# Patient Record
Sex: FEMALE | Race: WHITE | NOT HISPANIC OR LATINO | Employment: UNEMPLOYED | URBAN - METROPOLITAN AREA
[De-identification: names, ages, dates, MRNs, and addresses within clinical notes are randomized per-mention and may not be internally consistent; named-entity substitution may affect disease eponyms.]

---

## 2017-01-08 ENCOUNTER — HOSPITAL ENCOUNTER (EMERGENCY)
Facility: HOSPITAL | Age: 27
Discharge: HOME | End: 2017-01-08
Attending: EMERGENCY MEDICINE
Payer: MEDICAID

## 2017-01-08 VITALS
TEMPERATURE: 98 F | WEIGHT: 215 LBS | SYSTOLIC BLOOD PRESSURE: 124 MMHG | OXYGEN SATURATION: 99 % | RESPIRATION RATE: 20 BRPM | HEIGHT: 65 IN | BODY MASS INDEX: 35.82 KG/M2 | HEART RATE: 98 BPM | DIASTOLIC BLOOD PRESSURE: 60 MMHG

## 2017-01-08 DIAGNOSIS — L02.31 ABSCESS OF BUTTOCK, RIGHT: ICD-10-CM

## 2017-01-08 DIAGNOSIS — N30.90 CYSTITIS: Primary | ICD-10-CM

## 2017-01-08 LAB
B-HCG UR QL: NEGATIVE
BACTERIA #/AREA URNS HPF: ABNORMAL /HPF
BILIRUB UR QL STRIP: NEGATIVE
CLARITY UR: ABNORMAL
COLOR UR: ABNORMAL
CTP QC/QA: YES
GLUCOSE UR QL STRIP: NEGATIVE
HGB UR QL STRIP: ABNORMAL
HYALINE CASTS #/AREA URNS LPF: 0 /LPF
KETONES UR QL STRIP: NEGATIVE
LEUKOCYTE ESTERASE UR QL STRIP: ABNORMAL
MICROSCOPIC COMMENT: ABNORMAL
NITRITE UR QL STRIP: POSITIVE
PH UR STRIP: 5 [PH] (ref 5–8)
PROT UR QL STRIP: ABNORMAL
RBC #/AREA URNS HPF: 6 /HPF (ref 0–4)
SP GR UR STRIP: ABNORMAL (ref 1–1.03)
URN SPEC COLLECT METH UR: ABNORMAL
UROBILINOGEN UR STRIP-ACNC: 1 EU/DL
WBC #/AREA URNS HPF: >100 /HPF (ref 0–5)

## 2017-01-08 PROCEDURE — 25000003 PHARM REV CODE 250: Performed by: EMERGENCY MEDICINE

## 2017-01-08 PROCEDURE — 81000 URINALYSIS NONAUTO W/SCOPE: CPT

## 2017-01-08 PROCEDURE — 87088 URINE BACTERIA CULTURE: CPT

## 2017-01-08 PROCEDURE — 99284 EMERGENCY DEPT VISIT MOD MDM: CPT

## 2017-01-08 PROCEDURE — 87086 URINE CULTURE/COLONY COUNT: CPT

## 2017-01-08 PROCEDURE — 87186 SC STD MICRODIL/AGAR DIL: CPT

## 2017-01-08 PROCEDURE — 87077 CULTURE AEROBIC IDENTIFY: CPT

## 2017-01-08 PROCEDURE — 81025 URINE PREGNANCY TEST: CPT | Performed by: EMERGENCY MEDICINE

## 2017-01-08 RX ORDER — IBUPROFEN 600 MG/1
600 TABLET ORAL
Status: COMPLETED | OUTPATIENT
Start: 2017-01-08 | End: 2017-01-08

## 2017-01-08 RX ORDER — CLINDAMYCIN HYDROCHLORIDE 150 MG/1
300 CAPSULE ORAL 4 TIMES DAILY
Qty: 40 CAPSULE | Refills: 0 | Status: SHIPPED | OUTPATIENT
Start: 2017-01-08 | End: 2017-01-13

## 2017-01-08 RX ORDER — HYDROCODONE BITARTRATE AND ACETAMINOPHEN 5; 325 MG/1; MG/1
1 TABLET ORAL EVERY 6 HOURS PRN
Qty: 20 TABLET | Refills: 0 | Status: SHIPPED | OUTPATIENT
Start: 2017-01-08 | End: 2017-04-24

## 2017-01-08 RX ORDER — PHENAZOPYRIDINE HYDROCHLORIDE 200 MG/1
200 TABLET, FILM COATED ORAL 3 TIMES DAILY
Qty: 6 TABLET | Refills: 0 | Status: SHIPPED | OUTPATIENT
Start: 2017-01-08 | End: 2017-01-18

## 2017-01-08 RX ORDER — NITROFURANTOIN 25; 75 MG/1; MG/1
100 CAPSULE ORAL 2 TIMES DAILY
Qty: 10 CAPSULE | Refills: 0 | Status: SHIPPED | OUTPATIENT
Start: 2017-01-08 | End: 2017-01-13

## 2017-01-08 RX ADMIN — IBUPROFEN 600 MG: 600 TABLET, FILM COATED ORAL at 04:01

## 2017-01-08 NOTE — DISCHARGE INSTRUCTIONS
Abscess (Antibiotic Treatment Only)  An abscess (sometimes called a boil) occurs when bacteria get trapped under the skin and begin to grow. Pus forms inside the abscess as the body responds to the bacteria. An abscess can occur with an insect bite, ingrown hair, blocked oil gland, pimple, cyst, or puncture wound.  In the early stages, redness and tenderness are the only symptoms. Sometimes, this stage can be treated with antibiotics alone. If the abscess does not respond to antibiotic treatment, it will need to be drained with a small cut, under local anesthesia.  Home care  The following will help you care for your abscess at home:  · Soak the wound in hot water or apply hot packs (small towel soaked in hot water) to the area for 20 minutes at a time. Do this 3 to 4 times a day.  · Do not maryse, squeeze, or pop the boil yourself.  · Apply antibiotic cream or ointment onto the skin 3 to 4 times a day, unless something else was prescribed. Some ointments include an antibiotic plus a local pain reliever.  · If your doctor prescribed antibiotics, do not stop taking this medication until you have finished the medication or the doctor tells you to stop.  · You may use an over-the-counter pain medication to control pain, unless another pain medicine was prescribed. If you have chronic liver or kidney disease or ever had a stomach ulcer or gastrointestinal bleeding, talk with your doctor before using these any of these.  Follow-up care  Follow up with your health care provider as advised by our staff. Look at your wound each day for the signs of worsening infection listed below.  When to seek medical care  Get prompt medical attention if any of the following occur:  · An increase in redness or swelling  · Red streaks in the skin leading away from the abscess  · An increase in local pain or swelling  · Fever of 100.4ºF (38ºC) or higher, or as directed by your health care provider  · Pus or fluid coming from the  abscess  · Boil returns after getting better  © 8974-9923 [employer]. [address] [place] [address]. All rights reserved. This information is not intended as a substitute for professional medical care. Always follow your healthcare professional's instructions.          Understanding Urinary Tract Infections (UTIs)  Most UTIs are caused by bacteria, although they may also be caused by viruses or fungi. Bacteria from the bowel are the most common source of infection. The infection may begin because of any of the following:  · Sexual activity. During sex, germs can travel from the penis, vagina, or rectum into the urethra.   · Germs on the skin outside the rectum may travel into the urethra. This is more common in women since the rectum and urethra are closer to each other than in men. Wiping from front to back after using the toilet and keeping the area clean can help prevent germs from getting to the urethra.  · Blockage of urine flow through the urinary tract. If urine sits too long, germs may begin to grow out of control.      Parts of the urinary tract  The infection can occur in any part of the urinary tract.  · The kidneys collect and store urine.  · The ureters carry urine from the kidneys to the bladder.  · The bladder holds urine until you are ready to let it out.  · The urethra carries urine from the bladder out of the body. It is shorter in women, so bacteria can move through it more easily. The urethra is longer in men, so a UTI is less likely to reach the bladder or kidneys in men.  © 2000-2016 [employer]. [address] [place] [id number]. All rights reserved. This information is not intended as a substitute for professional medical care. Always follow your healthcare professional's instructions.

## 2017-01-08 NOTE — ED AVS SNAPSHOT
[facility]  [address] [facility] [address]               Therese De Leon   2017  2:41 PM   ED    Description:  Female : 1990   Department:  [facility]           Your Care was Coordinated By:     Provider Role From To    Pablito Burciaga Jr., MD Attending Provider 17 1458 --      Reason for Visit     Urinary Tract Infection           Diagnoses this Visit        Comments    Cystitis    -  Primary     Abscess of buttock, right           ED Disposition     None           To Do List           Follow-up Information     Follow up with [facility] In 2 days.    Specialty:  Emergency Medicine    Why:  For wound re-check    Contact information:    [address]  [place] [address] [phone]       These Medications        Disp Refills Start End    clindamycin (CLEOCIN) 150 MG capsule 40 capsule 0 2017    Take 2 capsules (300 mg total) by mouth 4 (four) times daily. - Oral    Pharmacy: [facility]/pharmacy #[id number] - [address] [facility] Ph #: [phone]       nitrofurantoin, macrocrystal-monohydrate, (MACROBID) 100 MG capsule 10 capsule 0 2017    Take 1 capsule (100 mg total) by mouth 2 (two) times daily. - Oral    Pharmacy: [facility]/pharmacy #[id number] [address] [facility] Ph #: [phone]       phenazopyridine (PYRIDIUM) 200 MG tablet 6 tablet 0 2017    Take 1 tablet (200 mg total) by mouth 3 (three) times daily. - Oral    Pharmacy: [facility]/pharmacy #[id number] [facility] Ph #: [phone]       hydrocodone-acetaminophen 5-325mg (NORCO) 5-325 mg per tablet 20 tablet 0 2017     Take 1 tablet by mouth every 6 (six) hours as needed for Pain. - Oral    Pharmacy: [facility]/pharmacy #[id number] [facility]  [place]  #: [phone]         [facility]     [facility] - 24/7 Assistance  Registered nurses in the [facility] provide clinical advisement, health education, appointment booking, and other advisory services.  Call for this free service at [phone].             Medications           Message regarding Medications     Verify the changes and/or additions to your medication regime listed below are the same as discussed with your clinician today.  If any of these changes or additions are incorrect, please notify your healthcare provider.        START taking these NEW medications        Refills    clindamycin (CLEOCIN) 150 MG capsule 0    Sig: Take 2 capsules (300 mg total) by mouth 4 (four) times daily.    Class: Print    Route: Oral    nitrofurantoin, macrocrystal-monohydrate, (MACROBID) 100 MG capsule 0    Sig: Take 1 capsule (100 mg total) by mouth 2 (two) times daily.    Class: Print    Route: Oral    phenazopyridine (PYRIDIUM) 200 MG tablet 0    Sig: Take 1 tablet (200 mg total) by mouth 3 (three) times daily.    Class: Print    Route: Oral    hydrocodone-acetaminophen 5-325mg (NORCO) 5-325 mg per tablet 0    Sig: Take 1 tablet by mouth every 6 (six) hours as needed for Pain.    Class: Print    Route: Oral      These medications were administered today        Dose Freq    ibuprofen tablet 600 mg 600 mg ED 1 Time    Sig: Take 1 tablet (600 mg total) by mouth ED 1 Time.    Class: Normal    Route: Oral           Verify that the below list of medications is an accurate representation of the medications you are currently taking.  If none reported, the list may be blank. If incorrect, please contact your healthcare provider. Carry this list with you in case of emergency.           Current Medications     clindamycin (CLEOCIN) 150 MG capsule Take 2 capsules (300 mg total) by mouth 4 (four) times daily.    hydrocodone-acetaminophen 5-325mg (NORCO) 5-325 mg per tablet Take 1  "tablet by mouth every 6 (six) hours as needed for Pain.    ibuprofen tablet 600 mg Take 1 tablet (600 mg total) by mouth ED 1 Time.    MIRENA 20 mcg/24 hr (5 years) IUD     nitrofurantoin, macrocrystal-monohydrate, (MACROBID) 100 MG capsule Take 1 capsule (100 mg total) by mouth 2 (two) times daily.    phenazopyridine (PYRIDIUM) 200 MG tablet Take 1 tablet (200 mg total) by mouth 3 (three) times daily.           Clinical Reference Information           Your Vitals Were     BP Pulse Temp Resp Height Weight    131/69 (BP Location: Left arm, Patient Position: Sitting) 108 98 °F (36.7 °C) (Oral) 18 5' 5" (1.651 m) 97.5 kg (215 lb)    SpO2 BMI             99% 35.78 kg/m2         Allergies as of 1/8/2017        Reactions    Bactrim [Sulfamethoxazole-trimethoprim]     Pt was told when she was younger    Pcn [Penicillins]     Pt was told when she was younger      Immunizations Administered on Date of Encounter - 1/8/2017     None      ED Micro, Lab, POCT     Start Ordered       Status Ordering Provider    01/08/17 1618 01/08/17 1618  Urine culture  Once      In process     01/08/17 1617 01/08/17 1617  Urine culture **CANNOT BE ORDERED STAT**  Add-on      Completed     01/08/17 1449 01/08/17 1448  Urinalysis  STAT      In process     01/08/17 1449 01/08/17 1448  POCT urine pregnancy  Once      Final result       ED Imaging Orders     None        Discharge Instructions         Abscess (Antibiotic Treatment Only)  An abscess (sometimes called a boil) occurs when bacteria get trapped under the skin and begin to grow. Pus forms inside the abscess as the body responds to the bacteria. An abscess can occur with an insect bite, ingrown hair, blocked oil gland, pimple, cyst, or puncture wound.  In the early stages, redness and tenderness are the only symptoms. Sometimes, this stage can be treated with antibiotics alone. If the abscess does not respond to antibiotic treatment, it will need to be drained with a small cut, under " local anesthesia.  Home care  The following will help you care for your abscess at home:  · Soak the wound in hot water or apply hot packs (small towel soaked in hot water) to the area for 20 minutes at a time. Do this 3 to 4 times a day.  · Do not maryse, squeeze, or pop the boil yourself.  · Apply antibiotic cream or ointment onto the skin 3 to 4 times a day, unless something else was prescribed. Some ointments include an antibiotic plus a local pain reliever.  · If your doctor prescribed antibiotics, do not stop taking this medication until you have finished the medication or the doctor tells you to stop.  · You may use an over-the-counter pain medication to control pain, unless another pain medicine was prescribed. If you have chronic liver or kidney disease or ever had a stomach ulcer or gastrointestinal bleeding, talk with your doctor before using these any of these.  Follow-up care  Follow up with your health care provider as advised by our staff. Look at your wound each day for the signs of worsening infection listed below.  When to seek medical care  Get prompt medical attention if any of the following occur:  · An increase in redness or swelling  · Red streaks in the skin leading away from the abscess  · An increase in local pain or swelling  · Fever of 100.4ºF (38ºC) or higher, or as directed by your health care provider  · Pus or fluid coming from the abscess  · Boil returns after getting better  © 9474-0800 [employer]. [address] [place] [address]. All rights reserved. This information is not intended as a substitute for professional medical care. Always follow your healthcare professional's instructions.          Understanding Urinary Tract Infections (UTIs)  Most UTIs are caused by bacteria, although they may also be caused by viruses or fungi. Bacteria from the bowel are the most common source of infection. The infection may begin because of any of the following:  · Sexual  activity. During sex, germs can travel from the penis, vagina, or rectum into the urethra.   · Germs on the skin outside the rectum may travel into the urethra. This is more common in women since the rectum and urethra are closer to each other than in men. Wiping from front to back after using the toilet and keeping the area clean can help prevent germs from getting to the urethra.  · Blockage of urine flow through the urinary tract. If urine sits too long, germs may begin to grow out of control.      Parts of the urinary tract  The infection can occur in any part of the urinary tract.  · The kidneys collect and store urine.  · The ureters carry urine from the kidneys to the bladder.  · The bladder holds urine until you are ready to let it out.  · The urethra carries urine from the bladder out of the body. It is shorter in women, so bacteria can move through it more easily. The urethra is longer in men, so a UTI is less likely to reach the bladder or kidneys in men.  © 0437-9183 [employer]. [address] [place] [address]. All rights reserved. This information is not intended as a substitute for professional medical care. Always follow your healthcare professional's instructions.          Smoking Cessation     If you would like to quit smoking:   You may be eligible for free services if you are a [place] resident and started smoking cigarettes before September 1, 1988.  Call the Smoking Cessation [employer] ([employer]) toll free at (060) 146-3392 or (187) 042-1592.   Call 6-527-QUIT-NOW if you do not meet the above criteria.             [facility] complies with applicable Federal civil rights laws and does not discriminate on the basis of race, color, national origin, age, disability, or sex.        Language Assistance Services     ATTENTION: Language assistance services are available, free of charge. Please call [phone].      ATENCIÓN: devorah Pisano  disposición servicios gratuitos de asistencia lingüística. Lllea al [phone].     RUCHI Ý: N?u b?n nói Ti?ng Vi?t, có các d?ch v? h? tr? ngôn ng? mi?n phí dành cho b?n. G?i s? [phone].

## 2017-01-08 NOTE — ED PROVIDER NOTES
Encounter Date: 2017       History     Chief Complaint   Patient presents with    Urinary Tract Infection     for 4 days, also abcess to sacral region.     Review of patient's allergies indicates:   Allergen Reactions    Bactrim [sulfamethoxazole-trimethoprim]      Pt was told when she was younger    Pcn [penicillins]      Pt was told when she was younger     HPI Comments: Therese De Leon, a 26 y.o. female, complains of dysuria and frequency for the past 4 days.  She also complains of soreness on the superior aspect of her buttock.  She denies any drainage.  She denies any fever.  Pain location: Suprapubic pressure and buttock pain  Pain Severity: Mild-to-moderate    Pain timin days  Pain character: Pressure in the suprapubic area in mild distress.  Throbbing buttock    Associated with or Modified by: (see above)        Past Medical History   Diagnosis Date    Acid reflux     Gallstones 11-12-15    Pneumonia      2 episodes of pneumonia    Seasonal allergies      Past Medical History Pertinent Negatives   Diagnosis Date Noted    Asthma 2016    Diabetes mellitus 2016    Hypertension 2016     History reviewed. No pertinent past surgical history.  Family History   Problem Relation Age of Onset    Asthma Mother     Diabetes Mother     Scoliosis Mother      Social History   Substance Use Topics    Smoking status: Current Every Day Smoker     Packs/day: 0.20     Types: Cigarettes    Smokeless tobacco: Never Used    Alcohol use No     Review of Systems   Constitutional: Negative for fever.   Genitourinary: Positive for difficulty urinating, dysuria and frequency.   Skin:        Pain and swelling superior buttock possible boil   All other systems reviewed and are negative.      Physical Exam   Initial Vitals   BP Pulse Resp Temp SpO2   17 1320 17 1320 17 1320 17 1320 17 1320   131/69 108 18 98 °F (36.7 °C) 99 %     Physical Exam    Nursing note and  vitals reviewed.  Constitutional: She appears well-developed and well-nourished.   Abdominal:   Mild suprapubic tenderness but no guarding no rebound.  No flank tenderness to percussion.   Skin: Skin is warm and dry.   There is a 3 cm diameter area of tenderness and induration in the superior gluteal fold.  There is no fluctuance.  There is no drainage.  There is no cellulitis.   Psychiatric: She has a normal mood and affect. Her behavior is normal. Thought content normal.         ED Course   Procedures  Labs Reviewed   URINALYSIS   POCT URINE PREGNANCY             Medical Decision Making:   Initial Assessment:    26 y.o. female, complains of dysuria and frequency for the past 4 days.  She also complains of soreness on the superior aspect of her buttock.  She denies any drainage.  She denies any fever.  Pain location: Suprapubic pressure and buttock pain  Pain Severity: Mild-to-moderate    Pain timin days  PE: Mild suprapubic tenderness;  Soft tissue swelling with early abscess formation, no fluctuance no drainage no cellulitis.  Clinical Tests:   Lab Tests: Ordered  ED Management:  The patient will be treated for urinary tract infection with Macrobid and Pyridium.  Culture was taken of urine.  The patient will be treated for an abscess of the buttocks with clindamycin and Norco.  She will return in 48 hours for wound check.                   ED Course     Clinical Impression:   The primary encounter diagnosis was Cystitis. A diagnosis of Abscess of buttock, right was also pertinent to this visit.          Pablito Burciaga Jr., MD  17 [phone]

## 2017-01-10 ENCOUNTER — HOSPITAL ENCOUNTER (EMERGENCY)
Facility: HOSPITAL | Age: 27
Discharge: HOME | End: 2017-01-10
Attending: EMERGENCY MEDICINE
Payer: MEDICAID

## 2017-01-10 VITALS
HEIGHT: 65 IN | HEART RATE: 99 BPM | DIASTOLIC BLOOD PRESSURE: 77 MMHG | OXYGEN SATURATION: 99 % | TEMPERATURE: 98 F | SYSTOLIC BLOOD PRESSURE: 125 MMHG | WEIGHT: 215 LBS | BODY MASS INDEX: 35.82 KG/M2 | RESPIRATION RATE: 18 BRPM

## 2017-01-10 DIAGNOSIS — L02.91 ABSCESS: Primary | ICD-10-CM

## 2017-01-10 LAB
B-HCG UR QL: NEGATIVE
BACTERIA UR CULT: NORMAL
CTP QC/QA: YES

## 2017-01-10 PROCEDURE — 10061 I&D ABSCESS COMP/MULTIPLE: CPT

## 2017-01-10 PROCEDURE — 25000003 PHARM REV CODE 250: Performed by: NURSE PRACTITIONER

## 2017-01-10 PROCEDURE — 99283 EMERGENCY DEPT VISIT LOW MDM: CPT | Mod: 25

## 2017-01-10 RX ORDER — LIDOCAINE HYDROCHLORIDE 10 MG/ML
10 INJECTION INFILTRATION; PERINEURAL
Status: COMPLETED | OUTPATIENT
Start: 2017-01-10 | End: 2017-01-10

## 2017-01-10 RX ORDER — OXYCODONE AND ACETAMINOPHEN 5; 325 MG/1; MG/1
1 TABLET ORAL EVERY 4 HOURS PRN
Qty: 12 TABLET | Refills: 0 | Status: SHIPPED | OUTPATIENT
Start: 2017-01-10 | End: 2017-04-24

## 2017-01-10 RX ORDER — OXYCODONE AND ACETAMINOPHEN 5; 325 MG/1; MG/1
1 TABLET ORAL
Status: COMPLETED | OUTPATIENT
Start: 2017-01-10 | End: 2017-01-10

## 2017-01-10 RX ADMIN — LIDOCAINE HYDROCHLORIDE 10 ML: 10 INJECTION, SOLUTION INFILTRATION; PERINEURAL at 07:01

## 2017-01-10 RX ADMIN — OXYCODONE AND ACETAMINOPHEN 1 TABLET: 5; 325 TABLET ORAL at 08:01

## 2017-01-10 NOTE — ED AVS SNAPSHOT
[facility]  [address] [place] [address]               Therese De Leon   1/10/2017  7:22 PM   ED    Description:  Female : 1990   Department:  [facility]           Your Care was Coordinated By:     Provider Role From To    Armando Marcus MD Attending Provider 01/10/17 1945 --    Sarbjit Salas NP Nurse Practitioner 01/10/17 1945 --      Reason for Visit     Abscess           Diagnoses this Visit        Comments    Abscess    -  Primary       ED Disposition     None           To Do List           Follow-up Information     Follow up with [facility] In 2 days.    Specialty:  Emergency Medicine    Why:  For wound re-check    Contact information:    [address] [place] [address] [phone]        Follow up with Sabi Rhodes MD. Schedule an appointment as soon as possible for a visit in 2 days.    Specialty:  General Surgery    Why:  For wound re-check    Contact information:    [address] [facility] [address]  [address]  [place] [address]  [phone]        [facility]     [facility] -  Assistance  Registered nurses in the [facility] provide clinical advisement, health education, appointment booking, and other advisory services.  Call for this free service at [phone].             Medications           Message regarding Medications     Verify the changes and/or additions to your medication regime listed below are the same as discussed with your clinician today.  If any of these changes or additions are incorrect, please notify your healthcare provider.        These medications were administered today        Dose Freq    lidocaine HCL 10 mg/ml (1%) injection 10 mL 10 mL ED 1 Time    Sig: 10 mLs by Infiltration route ED 1 Time.    Class: Normal    Route: Infiltration    oxycodone-acetaminophen 5-325 mg per tablet 1 tablet 1 tablet ED 1 Time    Sig: Take 1  "tablet by mouth ED 1 Time.    Class: Normal    Route: Oral           Verify that the below list of medications is an accurate representation of the medications you are currently taking.  If none reported, the list may be blank. If incorrect, please contact your healthcare provider. Carry this list with you in case of emergency.           Current Medications     clindamycin (CLEOCIN) 150 MG capsule Take 2 capsules (300 mg total) by mouth 4 (four) times daily.    hydrocodone-acetaminophen 5-325mg (NORCO) 5-325 mg per tablet Take 1 tablet by mouth every 6 (six) hours as needed for Pain.    nitrofurantoin, macrocrystal-monohydrate, (MACROBID) 100 MG capsule Take 1 capsule (100 mg total) by mouth 2 (two) times daily.    phenazopyridine (PYRIDIUM) 200 MG tablet Take 1 tablet (200 mg total) by mouth 3 (three) times daily.    MIRENA 20 mcg/24 hr (5 years) IUD     oxycodone-acetaminophen 5-325 mg per tablet 1 tablet Take 1 tablet by mouth ED 1 Time.           Clinical Reference Information           Your Vitals Were     BP Pulse Temp Resp Height Weight    125/77 (BP Location: Left arm, Patient Position: Standing) 99 98.3 °F (36.8 °C) (Oral) 18 5' 5" (1.651 m) 97.5 kg (215 lb)    SpO2 BMI             99% 35.78 kg/m2         Allergies as of 1/10/2017        Reactions    Bactrim [Sulfamethoxazole-trimethoprim]     Pt was told when she was younger    Pcn [Penicillins]     Pt was told when she was younger      Immunizations Administered on Date of Encounter - 1/10/2017     None      ED Micro, Lab, POCT     Start Ordered       Status Ordering Provider    01/10/17 0000 01/10/17 1933  POCT urine pregnancy     Comments:  This order was created through External Result Entry    Completed       ED Imaging Orders     None        Discharge Instructions         Abscess (Incision & Drainage)  An abscess (sometimes called a boil) occurs when bacteria get trapped under the skin and begin to grow. Pus forms inside the abscess as the body " responds to the bacteria. An abscess can occur with an insect bite, ingrown hair, blocked oil gland, pimple, cyst, or puncture wound.  Treatment of your abscess has required an incision to drain the pus. If the abscess pocket was large, a gauze packing may have been inserted. This will need to be removed and possibly replaced on your next visit. Antibiotics are not needed in the treatment of a simple abscess, unless the infection is spreading into the skin around the wound (cellulitis).  Healing of the wound will take about 1 to 2 weeks, depending on the size of the abscess. Healthy tissue will grow from the bottom and sides of the opening until it seals over.  Home care  The following home care guidelines can help your wound heal:  · The wound may drain for the first 2 days. Cover the wound with a clean dry dressing. If the dressing becomes soaked with blood or pus, change it.  · If a gauze packing was placed inside the abscess cavity, you may be told to remove it yourself. You may do this in the shower. Once the packing is removed, you should wash the area in the shower or bath 3 to 4 times a day, until the skin opening has closed. Make sure you wash your hands after changing the packing or cleaning the wound.  · If you were prescribed antibiotics, take them as directed until they are all gone.  · You may use acetaminophen or ibuprofen to control pain, unless another pain medicine was prescribed. If you have liver disease or ever had a stomach ulcer, talk with your doctor before using these medicines.  Follow-up care  Follow up with your doctor as advised by our staff. If a gauze packing was inserted in your wound, it should be removed in 1 to 2 days. Check your wound every day for the signs of worsening infection listed below.  When to seek medical advice  Call your healthcare provider right away if any of the following occur:  · Increasing redness or swelling  · Red streaks in the skin leading away from the  wound  · Increasing local pain or swelling  · Continued pus draining from the wound 2 days after treatment  · Fever of 100.4ºF (38ºC) or higher, or as directed by your healthcare provider  · Boil returns when you are at home  © 8529-9153 [employer]. [address] [place] [address]. All rights reserved. This information is not intended as a substitute for professional medical care. Always follow your healthcare professional's instructions.          Smoking Cessation     If you would like to quit smoking:   You may be eligible for free services if you are a [place] resident and started smoking cigarettes before September 1, 1988.  Call the Smoking Cessation [employer] (SCT) toll free at (264) 361-9760 or (813) 521-0917.   Call 8-819-QUIT-NOW if you do not meet the above criteria.             [facility] complies with applicable Federal civil rights laws and does not discriminate on the basis of race, color, national origin, age, disability, or sex.        Language Assistance Services     ATTENTION: Language assistance services are available, free of charge. Please call [phone].      ATENCIÓN: Si habla español, tiene a ibarra disposición servicios gratuitos de asistencia lingüística. Llame al [phone].     RUCHI Ý: N?u b?n nói Ti?ng Vi?t, có các d?ch v? h? tr? ngôn ng? mi?n phí dành cho b?n. G?i s? [phone].

## 2017-01-11 NOTE — ED PROVIDER NOTES
Encounter Date: 1/10/2017       History     Chief Complaint   Patient presents with    Abscess     left gluteal fold abscess x 6 days, was seen in ED and told to return to ED if worsened.  Pt states pain is worse     Review of patient's allergies indicates:   Allergen Reactions    Bactrim [sulfamethoxazole-trimethoprim]      Pt was told when she was younger    Pcn [penicillins]      Pt was told when she was younger     HPI Comments: Well appearing, non toxic, afebrile 26 year old female here with c/o abscess. Pt reports present x 6 days. Seen here 2 days ago and prescribed Clindamycin and Norco, and Macrobid with pyridium for UTI. Pt reports improvement in urinary symptoms but pain and swelling worsening to abscess. Pt reports taking medications as prescribed.    Patient is a 26 y.o. female presenting with the following complaint: abscess. The history is provided by the patient.   Abscess    This is a new problem. Illness onset: 6 days ago. The problem occurs continuously. The problem has been gradually worsening. Affected Location: left gluteal cleft. The pain is at a severity of 8/10. The abscess is characterized by painfulness, swelling and redness. Pertinent negatives include no anorexia, no decrease in physical activity, not sleeping less, not drinking less, no fever, no diarrhea, no vomiting, no congestion, no rhinorrhea, no sore throat, no decreased responsiveness and no cough.     Past Medical History   Diagnosis Date    Acid reflux     Gallstones 11-12-15    Pneumonia 2014     2 episodes of pneumonia    Seasonal allergies      Past Medical History Pertinent Negatives   Diagnosis Date Noted    Asthma 4/13/2016    Diabetes mellitus 4/13/2016    Hypertension 4/13/2016     History reviewed. No pertinent past surgical history.  Family History   Problem Relation Age of Onset    Asthma Mother     Diabetes Mother     Scoliosis Mother      Social History   Substance Use Topics    Smoking status:  Current Every Day Smoker     Packs/day: 0.20     Types: Cigarettes    Smokeless tobacco: Never Used    Alcohol use No     Review of Systems   Constitutional: Negative for chills, decreased responsiveness and fever.   HENT: Negative for congestion, rhinorrhea and sore throat.    Respiratory: Negative for cough and shortness of breath.    Cardiovascular: Negative for chest pain.   Gastrointestinal: Negative for abdominal distention, abdominal pain, anorexia, diarrhea, nausea and vomiting.   Genitourinary: Negative for difficulty urinating and dysuria.   Musculoskeletal: Negative for arthralgias, back pain, gait problem, joint swelling, myalgias, neck pain and neck stiffness.   Skin: Positive for color change. Negative for pallor, rash and wound.   Neurological: Negative for headaches.       Physical Exam   Initial Vitals   BP Pulse Resp Temp SpO2   01/10/17 1920 01/10/17 1920 01/10/17 1920 01/10/17 1920 01/10/17 1920   125/77 99 18 98.3 °F (36.8 °C) 99 %     Physical Exam    Nursing note and vitals reviewed.  Constitutional: She appears well-developed and well-nourished. She is not diaphoretic. No distress.   HENT:   Head: Normocephalic and atraumatic.   Right Ear: External ear normal.   Left Ear: External ear normal.   Nose: Nose normal.   Mouth/Throat: Oropharynx is clear and moist. No oropharyngeal exudate.   Eyes: Conjunctivae are normal. Pupils are equal, round, and reactive to light. Right eye exhibits no discharge. Left eye exhibits no discharge.   Neck: Normal range of motion.   Cardiovascular: Normal rate, regular rhythm and intact distal pulses.   Pulmonary/Chest: Effort normal and breath sounds normal. No accessory muscle usage. No apnea, no tachypnea and no bradypnea. No respiratory distress. She has no decreased breath sounds. She has no wheezes. She has no rhonchi. She has no rales. She exhibits no tenderness.   Abdominal: Soft. Bowel sounds are normal. She exhibits no distension. There is no  tenderness. There is no rigidity, no rebound and no guarding.   Musculoskeletal: Normal range of motion. She exhibits tenderness. She exhibits no edema.   Neurological: She is alert and oriented to person, place, and time. She has normal strength.   Skin: Skin is warm and dry. Abscess noted. No rash noted. There is erythema. No pallor.        Psychiatric: She has a normal mood and affect. Her behavior is normal. Judgment and thought content normal.         ED Course   I & D - Incision and Drainage  Date/Time: 1/10/2017 8:07 PM  Performed by: LAVELLE NGUYEN  Authorized by: MÓNICA GONZALEZ   Consent Done: Emergent Situation  Type: abscess  Body area: anogenital  Location details: gluteal cleft  Anesthesia: local infiltration    Anesthesia:  Anesthesia: local infiltration  Local Anesthetic: lidocaine 1% without epinephrine   Anesthetic total: 5 mL  Patient sedated: no  Scalpel size: 11  Incision type: single straight  Complexity: complex  Drainage: purulent  Drainage amount: copious  Wound treatment: incision,  drainage,  expression of material,  wound left open and  wound packed  Packing material: 1/2 in gauze  Complications: No  Specimens: No  Implants: No  Patient tolerance: Patient tolerated the procedure well with no immediate complications        Labs Reviewed - No data to display          Medical Decision Making:   Initial Assessment:   Well appearing, non toxic, afebrile 26 year old female here with c/o abscess. Pt reports present x 6 days. Seen here 2 days ago and prescribed Clindamycin and Norco, and Macrobid with pyridium for UTI. Pt reports improvement in urinary symptoms but pain and swelling worsening to abscess. Pt reports taking medications as prescribed. Pt AAO x3, oropharynx moist and clear, resp even and unlabored, breath sounds CTA, no tachypnea, abdomen soft, non distended, non tender, BS active, no N/V/D. 2 cm x 2 cm abscess with surrounding erythema and central area of fluctuance, tender to  palpation  Differential Diagnosis:   Abscess, Cellulitis, Pyelo nidal Cyst   ED Management:  Abscess vs Pyelo Nidal cyst due to location of abscess with surrounding cellulitis. Treated in ED with I&D, pt tolerated well, no complications encountered. Instructed to apply warm moist compresses. Continue Clindamycin, Norco, Macrobid, and Pyridium. Instructed to return in 2 days to have wound rechecked and packing either removed or changed. Information given for Dr. Rhodes for further evaluation and treatment of possible pyelo nidal cyst. Discussed strict return precautions such increased pain, erythema, edema, or drainage. Pt in agreement with POC, verbalized understanding.              Attending Attestation:     Physician Attestation Statement for NP/PA:   I have conducted a face to face encounter with this patient in addition to the NP/PA, due to NP/PA Request    Other NP/PA Attestation Additions:    History of Present Illness: Agree: This is a 26-year-old who presents to the emergency Department today with an abscess to the gluteus.    Physical Exam: Agree   Medical Decision Making: Agree: The abscess has been I&D, a significant amount of purulent material obtained, the patient will be placed on antibiotic follow-up with her primary care physician in 2 days for wound recheck.                 ED Course     Clinical Impression:   The encounter diagnosis was Abscess.          Sarbjit Salas NP  01/10/17 2012       Armando Marcus MD  01/14/17 7706

## 2017-01-11 NOTE — ED NOTES
Pt c/o abscess to L buttocks near gluteal fold x 1 week.  Pt states she was seen in ED 2 days ago and placed on cipro for bladder infection and told to return for abscess if needed.  Pt states abscess not improved.

## 2017-01-11 NOTE — DISCHARGE INSTRUCTIONS
Abscess (Incision & Drainage)  An abscess (sometimes called a boil) occurs when bacteria get trapped under the skin and begin to grow. Pus forms inside the abscess as the body responds to the bacteria. An abscess can occur with an insect bite, ingrown hair, blocked oil gland, pimple, cyst, or puncture wound.  Treatment of your abscess has required an incision to drain the pus. If the abscess pocket was large, a gauze packing may have been inserted. This will need to be removed and possibly replaced on your next visit. Antibiotics are not needed in the treatment of a simple abscess, unless the infection is spreading into the skin around the wound (cellulitis).  Healing of the wound will take about 1 to 2 weeks, depending on the size of the abscess. Healthy tissue will grow from the bottom and sides of the opening until it seals over.  Home care  The following home care guidelines can help your wound heal:  · The wound may drain for the first 2 days. Cover the wound with a clean dry dressing. If the dressing becomes soaked with blood or pus, change it.  · If a gauze packing was placed inside the abscess cavity, you may be told to remove it yourself. You may do this in the shower. Once the packing is removed, you should wash the area in the shower or bath 3 to 4 times a day, until the skin opening has closed. Make sure you wash your hands after changing the packing or cleaning the wound.  · If you were prescribed antibiotics, take them as directed until they are all gone.  · You may use acetaminophen or ibuprofen to control pain, unless another pain medicine was prescribed. If you have liver disease or ever had a stomach ulcer, talk with your doctor before using these medicines.  Follow-up care  Follow up with your doctor as advised by our staff. If a gauze packing was inserted in your wound, it should be removed in 1 to 2 days. Check your wound every day for the signs of worsening infection listed below.  When to  seek medical advice  Call your healthcare provider right away if any of the following occur:  · Increasing redness or swelling  · Red streaks in the skin leading away from the wound  · Increasing local pain or swelling  · Continued pus draining from the wound 2 days after treatment  · Fever of 100.4ºF (38ºC) or higher, or as directed by your healthcare provider  · Boil returns when you are at home  © 6385-5460 [employer]. [address] [place] [address], [place] [address]. All rights reserved. This information is not intended as a substitute for professional medical care. Always follow your healthcare professional's instructions.

## 2017-04-24 ENCOUNTER — HOSPITAL ENCOUNTER (OUTPATIENT)
Facility: HOSPITAL | Age: 27
LOS: 1 days | Discharge: HOME | End: 2017-04-25
Attending: EMERGENCY MEDICINE | Admitting: SURGERY
Payer: MEDICAID

## 2017-04-24 DIAGNOSIS — K80.20 CALCULUS OF GALLBLADDER WITHOUT CHOLECYSTITIS WITHOUT OBSTRUCTION: Primary | ICD-10-CM

## 2017-04-24 DIAGNOSIS — K81.9 CHOLECYSTITIS: ICD-10-CM

## 2017-04-24 LAB
ALBUMIN SERPL BCP-MCNC: 3.9 G/DL
ALP SERPL-CCNC: 71 U/L
ALT SERPL W/O P-5'-P-CCNC: 21 U/L
ANION GAP SERPL CALC-SCNC: 9 MMOL/L
AST SERPL-CCNC: 15 U/L
B-HCG UR QL: NEGATIVE
BASOPHILS # BLD AUTO: 0.03 K/UL
BASOPHILS NFR BLD: 0.4 %
BILIRUB SERPL-MCNC: 0.7 MG/DL
BILIRUB UR QL STRIP: NEGATIVE
BUN SERPL-MCNC: 13 MG/DL
CALCIUM SERPL-MCNC: 9.5 MG/DL
CHLORIDE SERPL-SCNC: 104 MMOL/L
CLARITY UR: CLEAR
CO2 SERPL-SCNC: 25 MMOL/L
COLOR UR: YELLOW
CREAT SERPL-MCNC: 0.7 MG/DL
CTP QC/QA: YES
DIFFERENTIAL METHOD: ABNORMAL
EOSINOPHIL # BLD AUTO: 0.6 K/UL
EOSINOPHIL NFR BLD: 6.9 %
ERYTHROCYTE [DISTWIDTH] IN BLOOD BY AUTOMATED COUNT: 14.7 %
EST. GFR  (AFRICAN AMERICAN): >60 ML/MIN/1.73 M^2
EST. GFR  (NON AFRICAN AMERICAN): >60 ML/MIN/1.73 M^2
GLUCOSE SERPL-MCNC: 91 MG/DL
GLUCOSE UR QL STRIP: NEGATIVE
HCT VFR BLD AUTO: 40.8 %
HGB BLD-MCNC: 13.6 G/DL
HGB UR QL STRIP: NEGATIVE
KETONES UR QL STRIP: NEGATIVE
LEUKOCYTE ESTERASE UR QL STRIP: NEGATIVE
LIPASE SERPL-CCNC: 39 U/L
LYMPHOCYTES # BLD AUTO: 2 K/UL
LYMPHOCYTES NFR BLD: 23.6 %
MCH RBC QN AUTO: 27.6 PG
MCHC RBC AUTO-ENTMCNC: 33.3 %
MCV RBC AUTO: 83 FL
MONOCYTES # BLD AUTO: 0.4 K/UL
MONOCYTES NFR BLD: 5 %
NEUTROPHILS # BLD AUTO: 5.4 K/UL
NEUTROPHILS NFR BLD: 63.9 %
NITRITE UR QL STRIP: NEGATIVE
PH UR STRIP: 6 [PH] (ref 5–8)
PLATELET # BLD AUTO: 195 K/UL
PMV BLD AUTO: 10.4 FL
POTASSIUM SERPL-SCNC: 4.2 MMOL/L
PROT SERPL-MCNC: 8.1 G/DL
PROT UR QL STRIP: NEGATIVE
RBC # BLD AUTO: 4.93 M/UL
SODIUM SERPL-SCNC: 138 MMOL/L
SP GR UR STRIP: 1.01 (ref 1–1.03)
URN SPEC COLLECT METH UR: NORMAL
UROBILINOGEN UR STRIP-ACNC: NEGATIVE EU/DL
WBC # BLD AUTO: 8.43 K/UL

## 2017-04-24 PROCEDURE — 25000003 PHARM REV CODE 250: Performed by: EMERGENCY MEDICINE

## 2017-04-24 PROCEDURE — G0378 HOSPITAL OBSERVATION PER HR: HCPCS

## 2017-04-24 PROCEDURE — 80053 COMPREHEN METABOLIC PANEL: CPT

## 2017-04-24 PROCEDURE — 96372 THER/PROPH/DIAG INJ SC/IM: CPT

## 2017-04-24 PROCEDURE — 25000003 PHARM REV CODE 250: Performed by: COLON & RECTAL SURGERY

## 2017-04-24 PROCEDURE — 99285 EMERGENCY DEPT VISIT HI MDM: CPT | Mod: 25

## 2017-04-24 PROCEDURE — 96375 TX/PRO/DX INJ NEW DRUG ADDON: CPT

## 2017-04-24 PROCEDURE — 96361 HYDRATE IV INFUSION ADD-ON: CPT

## 2017-04-24 PROCEDURE — 63600175 PHARM REV CODE 636 W HCPCS: Performed by: COLON & RECTAL SURGERY

## 2017-04-24 PROCEDURE — 81003 URINALYSIS AUTO W/O SCOPE: CPT

## 2017-04-24 PROCEDURE — 96374 THER/PROPH/DIAG INJ IV PUSH: CPT

## 2017-04-24 PROCEDURE — 83690 ASSAY OF LIPASE: CPT

## 2017-04-24 PROCEDURE — 63600175 PHARM REV CODE 636 W HCPCS: Performed by: EMERGENCY MEDICINE

## 2017-04-24 PROCEDURE — 94761 N-INVAS EAR/PLS OXIMETRY MLT: CPT

## 2017-04-24 PROCEDURE — 85025 COMPLETE CBC W/AUTO DIFF WBC: CPT

## 2017-04-24 RX ORDER — DEXTROSE MONOHYDRATE, SODIUM CHLORIDE, AND POTASSIUM CHLORIDE 50; 1.49; 9 G/1000ML; G/1000ML; G/1000ML
INJECTION, SOLUTION INTRAVENOUS CONTINUOUS
Status: DISCONTINUED | OUTPATIENT
Start: 2017-04-24 | End: 2017-04-25

## 2017-04-24 RX ORDER — HEPARIN SODIUM 5000 [USP'U]/ML
5000 INJECTION, SOLUTION INTRAVENOUS; SUBCUTANEOUS EVERY 8 HOURS
Status: DISCONTINUED | OUTPATIENT
Start: 2017-04-24 | End: 2017-04-25 | Stop reason: HOSPADM

## 2017-04-24 RX ORDER — ACETAMINOPHEN 325 MG/1
650 TABLET ORAL EVERY 8 HOURS PRN
Status: DISCONTINUED | OUTPATIENT
Start: 2017-04-24 | End: 2017-04-25 | Stop reason: HOSPADM

## 2017-04-24 RX ORDER — MORPHINE SULFATE 2 MG/ML
2 INJECTION, SOLUTION INTRAMUSCULAR; INTRAVENOUS
Status: DISCONTINUED | OUTPATIENT
Start: 2017-04-24 | End: 2017-04-25

## 2017-04-24 RX ORDER — HYDROCODONE BITARTRATE AND ACETAMINOPHEN 5; 325 MG/1; MG/1
1 TABLET ORAL EVERY 4 HOURS PRN
Status: DISCONTINUED | OUTPATIENT
Start: 2017-04-24 | End: 2017-04-25

## 2017-04-24 RX ORDER — DEXTROSE MONOHYDRATE AND SODIUM CHLORIDE 5; .9 G/100ML; G/100ML
INJECTION, SOLUTION INTRAVENOUS CONTINUOUS
Status: DISCONTINUED | OUTPATIENT
Start: 2017-04-24 | End: 2017-04-24

## 2017-04-24 RX ORDER — ONDANSETRON 8 MG/1
8 TABLET, ORALLY DISINTEGRATING ORAL EVERY 8 HOURS PRN
Status: DISCONTINUED | OUTPATIENT
Start: 2017-04-24 | End: 2017-04-25 | Stop reason: HOSPADM

## 2017-04-24 RX ORDER — HYDROMORPHONE HYDROCHLORIDE 1 MG/ML
1 INJECTION, SOLUTION INTRAMUSCULAR; INTRAVENOUS; SUBCUTANEOUS EVERY 4 HOURS PRN
Status: DISCONTINUED | OUTPATIENT
Start: 2017-04-24 | End: 2017-04-25

## 2017-04-24 RX ORDER — ONDANSETRON 2 MG/ML
4 INJECTION INTRAMUSCULAR; INTRAVENOUS EVERY 12 HOURS PRN
Status: DISCONTINUED | OUTPATIENT
Start: 2017-04-24 | End: 2017-04-25 | Stop reason: HOSPADM

## 2017-04-24 RX ORDER — HYDROMORPHONE HYDROCHLORIDE 1 MG/ML
0.5 INJECTION, SOLUTION INTRAMUSCULAR; INTRAVENOUS; SUBCUTANEOUS EVERY 4 HOURS PRN
Status: DISCONTINUED | OUTPATIENT
Start: 2017-04-24 | End: 2017-04-25 | Stop reason: HOSPADM

## 2017-04-24 RX ORDER — METOCLOPRAMIDE HYDROCHLORIDE 5 MG/ML
5 INJECTION INTRAMUSCULAR; INTRAVENOUS EVERY 6 HOURS PRN
Status: DISCONTINUED | OUTPATIENT
Start: 2017-04-24 | End: 2017-04-25 | Stop reason: HOSPADM

## 2017-04-24 RX ORDER — HYDROMORPHONE HYDROCHLORIDE 1 MG/ML
1 INJECTION, SOLUTION INTRAMUSCULAR; INTRAVENOUS; SUBCUTANEOUS
Status: COMPLETED | OUTPATIENT
Start: 2017-04-24 | End: 2017-04-24

## 2017-04-24 RX ORDER — RAMELTEON 8 MG/1
8 TABLET ORAL NIGHTLY PRN
Status: DISCONTINUED | OUTPATIENT
Start: 2017-04-24 | End: 2017-04-25 | Stop reason: HOSPADM

## 2017-04-24 RX ORDER — DIPHENHYDRAMINE HYDROCHLORIDE 50 MG/ML
25 INJECTION INTRAMUSCULAR; INTRAVENOUS EVERY 4 HOURS PRN
Status: DISCONTINUED | OUTPATIENT
Start: 2017-04-24 | End: 2017-04-25 | Stop reason: HOSPADM

## 2017-04-24 RX ORDER — ONDANSETRON 2 MG/ML
4 INJECTION INTRAMUSCULAR; INTRAVENOUS
Status: COMPLETED | OUTPATIENT
Start: 2017-04-24 | End: 2017-04-24

## 2017-04-24 RX ORDER — HYDROCODONE BITARTRATE AND ACETAMINOPHEN 10; 325 MG/1; MG/1
1 TABLET ORAL EVERY 4 HOURS PRN
Status: DISCONTINUED | OUTPATIENT
Start: 2017-04-24 | End: 2017-04-25 | Stop reason: HOSPADM

## 2017-04-24 RX ADMIN — HYDROMORPHONE HYDROCHLORIDE 1 MG: 1 INJECTION, SOLUTION INTRAMUSCULAR; INTRAVENOUS; SUBCUTANEOUS at 02:04

## 2017-04-24 RX ADMIN — MORPHINE SULFATE 2 MG: 2 INJECTION, SOLUTION INTRAMUSCULAR; INTRAVENOUS at 05:04

## 2017-04-24 RX ADMIN — HEPARIN SODIUM 5000 UNITS: 5000 INJECTION, SOLUTION INTRAVENOUS; SUBCUTANEOUS at 10:04

## 2017-04-24 RX ADMIN — DEXTROSE MONOHYDRATE, SODIUM CHLORIDE, AND POTASSIUM CHLORIDE: 50; 9; 1.49 INJECTION, SOLUTION INTRAVENOUS at 10:04

## 2017-04-24 RX ADMIN — ONDANSETRON 4 MG: 2 INJECTION INTRAMUSCULAR; INTRAVENOUS at 02:04

## 2017-04-24 RX ADMIN — SODIUM CHLORIDE 1000 ML: 0.9 INJECTION, SOLUTION INTRAVENOUS at 02:04

## 2017-04-24 NOTE — ED PROVIDER NOTES
Encounter Date: 4/24/2017       History     Chief Complaint   Patient presents with    Abdominal Pain     right upper abdominal pain since today.     Review of patient's allergies indicates:   Allergen Reactions    Bactrim [sulfamethoxazole-trimethoprim]      Pt was told when she was younger    Pcn [penicillins]      Pt was told when she was younger     HPI Comments: 28 yo F with history of cholelithiasis, here with RUQ abdominal pain. Had a gallstone during pregnancy, but has not had it taken out. + nausea. Vomiting yesterday. No fevers or chills. No cough or SOB    Patient is a 27 y.o. female presenting with the following complaint: abdominal pain. The history is provided by the patient.   Abdominal Pain   The current episode started just prior to arrival. The onset of the illness was gradual. The abdominal pain is located in the RUQ and epigastric region. The abdominal pain radiates to the epigastric region. The severity of the abdominal pain is 7/10. The other symptoms of the illness do not include fever.     Past Medical History:   Diagnosis Date    Acid reflux     Gallstones 11-12-15    Pneumonia 2014    2 episodes of pneumonia    Seasonal allergies      History reviewed. No pertinent surgical history.  Family History   Problem Relation Age of Onset    Asthma Mother     Diabetes Mother     Scoliosis Mother      Social History   Substance Use Topics    Smoking status: Current Every Day Smoker     Packs/day: 0.20     Types: Cigarettes    Smokeless tobacco: Never Used    Alcohol use No     Review of Systems   Constitutional: Negative.  Negative for fever.   Eyes: Negative.    Respiratory: Negative.    Gastrointestinal: Positive for abdominal pain.   All other systems reviewed and are negative.      Physical Exam   Initial Vitals   BP Pulse Resp Temp SpO2   04/24/17 1337 04/24/17 1337 04/24/17 1337 04/24/17 1337 --   128/78 72 16 98.5 °F (36.9 °C)      Physical Exam    Nursing note and vitals  reviewed.  Constitutional: She appears well-developed and well-nourished. She appears distressed.   HENT:   Head: Normocephalic and atraumatic.   Eyes: EOM are normal. Pupils are equal, round, and reactive to light.   Neck: Normal range of motion. Neck supple.   Cardiovascular: Normal rate and normal heart sounds.   Pulmonary/Chest: Breath sounds normal.   Abdominal: Soft.   RUQ and epigastric tenderness  No CVA tenderness, no suprapubic tenderness   Musculoskeletal: Normal range of motion.   Neurological: She is alert and oriented to person, place, and time. She has normal strength. No cranial nerve deficit.   Skin: Skin is warm and dry.   Psychiatric: She has a normal mood and affect. Her behavior is normal. Thought content normal.         ED Course   Procedures  Labs Reviewed   CBC W/ AUTO DIFFERENTIAL - Abnormal; Notable for the following:        Result Value    RDW 14.7 (*)     Eos # 0.6 (*)     All other components within normal limits   COMPREHENSIVE METABOLIC PANEL   LIPASE   URINALYSIS             Medical Decision Making:   Initial Assessment:   26 yo F with history of cholelithiasis here with increased RUQ pain and nausea  Differential Diagnosis:   Cholelithiasis, choleycystitis,   Clinical Tests:   Lab Tests: Ordered and Reviewed  ED Management:  Patient to go to surgery with choleycystitis  Kept NPO  No elevated WBC  No transaminitis  No elevated lipase    Patient was given IVF and analgesics                   ED Course     Clinical Impression:   The encounter diagnosis was Calculus of gallbladder without cholecystitis without obstruction.          Danika Barry MD  04/24/17 [id number]

## 2017-04-24 NOTE — ED NOTES
Intermittent RUQ pain over last 2 years, was dx'd with gallstones while pregnant and has been having episodes of pain with n/v since.  This episode of RUQ pain with n/v began approx 3 hours ago.  Denies vomiting but is nauseous.  Denies diarrhea or fever.

## 2017-04-24 NOTE — H&P
[facility] Neuroendocrine Surgery/General Surgery  History & Physical    SUBJECTIVE:     Chief Complaint:   Sx cholelithiasis    History of Present Illness:  27yF diagnosed with gallstones two years ago during her pregnancy, presents with 4 hour history of worsening RUQ tenderness.  Admits to nausea, emesis.  Has had these biliary attacks over last couple weeks.  Denies fever, chills.  US demonstrates stones without evidence of cholecystitis.    Allergies:  Review of patient's allergies indicates:   Allergen Reactions    Bactrim [sulfamethoxazole-trimethoprim]      Pt was told when she was younger    Pcn [penicillins]      Pt was told when she was younger       Home Medications:  No current facility-administered medications on file prior to encounter.      Current Outpatient Prescriptions on File Prior to Encounter   Medication Sig    MIRENA 20 mcg/24 hr (5 years) IUD     [DISCONTINUED] hydrocodone-acetaminophen 5-325mg (NORCO) 5-325 mg per tablet Take 1 tablet by mouth every 6 (six) hours as needed for Pain.    [DISCONTINUED] oxycodone-acetaminophen (PERCOCET) 5-325 mg per tablet Take 1 tablet by mouth every 4 (four) hours as needed for Pain.       Past Medical History:   Diagnosis Date    Acid reflux     Gallstones 11-12-15    Pneumonia 2014    2 episodes of pneumonia    Seasonal allergies      History reviewed. No pertinent surgical history.  Family History   Problem Relation Age of Onset    Asthma Mother     Diabetes Mother     Scoliosis Mother      Social History   Substance Use Topics    Smoking status: Current Every Day Smoker     Packs/day: 0.20     Types: Cigarettes    Smokeless tobacco: Never Used    Alcohol use No        Review of Systems:  No SOB/CP  Abd pain as above  NO fever, chills  + nausea    OBJECTIVE:     Vital Signs:  Temp: 98.4 °F (36.9 °C) (04/24/17 1538)  Pulse: (!) 52 (04/24/17 1538)  Resp: 16 (04/24/17 1538)  BP: 101/69 (04/24/17 1538)  SpO2: 100 % (04/24/17 1538)    Physical  Exam:  General: well developed, well nourished, no distress  HEENT: normocephalic, atraumatic, hearing grossly normal bilaterally, mucous membranes moist, EOM intact, no scleral icterus  Neck: supple, symmetrical, trachea midline, no JVD  Lungs:  clear to auscultation bilaterally and normal respiratory effort  Cardiovascular: regular rate and rhythm.  Extremities: no cyanosis or edema, or clubbing, distal pulses palpable and symmetric  Abdomen: Soft/obese, RUQ tenderness, no overton's sign no rebound  Skin: Skin color, texture, turgor normal. No rashes or lesions  Musculoskeletal:no clubbing, cyanosis, no deformities  Neurologic: No focal numbness or weakness  Psych/Behavioral:  Alert and oriented, appropriate affect.    Laboratory:  Labs Reviewed   CBC W/ AUTO DIFFERENTIAL - Abnormal; Notable for the following:        Result Value    RDW 14.7 (*)     Eos # 0.6 (*)     All other components within normal limits   COMPREHENSIVE METABOLIC PANEL   LIPASE   URINALYSIS       Diagnostic Results:  Imaging Results         US Abdomen Limited (Final result) Result time:  04/24/17 15:39:17    Final result by Mark Jerome MD (04/24/17 15:39:17)    Impression:        Multiple gallstones.  Positive sonographic Overton's sign, suggesting cholecystitis.  However, no gallbladder wall thickening, pericholecystic fluid, or biliary duct dilatation.      Electronically signed by: Mark Jerome MD  Date:     04/24/17  Time:    15:39     Narrative:    Comparison: 11/12/15    Results: Limited right upper quadrant ultrasound performed. The visualized portions of the pancreas, abdominal aorta, and IVC are unremarkable.   Multiple gallstones.  Borderline gallbladder wall thickening.  Negative sonographic Overton's sign.  No pericholecystic fluid. The common bile duct is within normal limits at 4 mm. The liver measures 16.5 cm in length and demonstrates homogeneous echogenicity. The right kidney is normal in length measuring 11.3cm. No right  sided hydronephrosis or renal masses identified.              ASSESSMENT:     Sx cholelethiasis    PLAN:     Admit  NPO at MN  Am labs  OR in AM  Risk/benefits explained, amenable to OR for lap estee      Alexandra Dias MD  [facility] General Surgery

## 2017-04-24 NOTE — IP AVS SNAPSHOT
[facility]  [address]  [place] [address]  Phone: [phone]           Patient Discharge Instructions   Our goal is to set you up for success. This packet includes information on your condition, medications, and your home care.  It will help you care for yourself to prevent having to return to the hospital.     Please ask your nurse if you have any questions.      There are many details to remember when preparing to leave the hospital. Here is what you will need to do:    1. Take your medicine. If you are prescribed medications, review your Medication List on the following pages. You may have new medications to  at the pharmacy and others that you'll need to stop taking. Review the instructions for how and when to take your medications. Talk with your doctor or nurses if you are unsure of what to do.     2. Go to your follow-up appointments. Specific follow-up information is listed in the following pages. Your may be contacted by a nurse or clinical provider about future appointments. Be sure we have all of the phone numbers to reach you. Please contact your provider's office if you are unable to make an appointment.     3. Watch for warning signs. Your doctor or nurse will give you detailed warning signs to watch for and when to call for assistance. These instructions may also include educational information about your condition. If you experience any of warning signs to your health, call your doctor.               ** Verify the list of medication(s) below is accurate and up to date. Carry this with you in case of emergency. If your medications have changed, please notify your healthcare provider.             Medication List      START taking these medications        Additional Info                      ondansetron 8 MG Tbdl   Commonly known as:  ZOFRAN-ODT   Quantity:  20 tablet   Refills:  0   Dose:  8 mg    Instructions:  Take 1 tablet (8 mg total) by mouth every 6 (six) hours as  needed (nausea).     Begin Date    AM    Noon    PM    Bedtime         CHANGE how you take these medications        Additional Info                      oxycodone-acetaminophen 5-325 mg per tablet   Commonly known as:  PERCOCET   Quantity:  30 tablet   Refills:  0   Dose:  1 tablet   What changed:  reasons to take this    Instructions:  Take 1 tablet by mouth every 4 (four) hours as needed (moderate pain 2-5/10 pain scale).     Begin Date    AM    Noon    PM    Bedtime         CONTINUE taking these medications        Additional Info                      MIRENA 20 mcg/24 hr (5 years) IUD   Refills:  0   Generic drug:  levonorgestrel      Begin Date    AM    Noon    PM    Bedtime         ASK your doctor about these medications        Additional Info                      hydrocodone-acetaminophen 5-325mg 5-325 mg per tablet   Commonly known as:  NORCO   Quantity:  20 tablet   Refills:  0   Dose:  1 tablet    Instructions:  Take 1 tablet by mouth every 6 (six) hours as needed for Pain.     Begin Date    AM    Noon    PM    Bedtime            Where to Get Your Medications      You can get these medications from any pharmacy     Bring a paper prescription for each of these medications     ondansetron 8 MG Tbdl    oxycodone-acetaminophen 5-325 mg per tablet                  Please bring to all follow up appointments:    1. A copy of your discharge instructions.  2. All medicines you are currently taking in their original bottles.  3. Identification and insurance card.    Please arrive 15 minutes ahead of scheduled appointment time.    Please call 24 hours in advance if you must reschedule your appointment and/or time.        Your Scheduled Appointments     May 01, 2017 10:15 AM CDT   Established Patient Visit with Carmelo Celestin MD   [facility] ([facility]    [address] [place] [address]  [place] [address]   [phone]              Follow-up Information     Follow up with Carmelo  "MD Sabi On 5/1/2017.    Specialty:  General Surgery    Why:  @10:15am    Contact information:    [address]  [address]  [place] [address]  [phone]          Discharge References/Attachments     ACETAMINOPHEN; OXYCODONE TABLETS (ENGLISH)    ONDANSETRON TABLETS (ENGLISH)        Primary Diagnosis     Your primary diagnosis was:  Gallstones      Admission Information     Date & Time Provider Department CSN    4/24/2017  2:06 PM Carmelo Celestin MD [facility] [id number]      Care Providers     Provider Role Specialty Primary office phone    Carmelo Celestin MD Attending Provider General Surgery [phone]    Carmelo Celestin MD Surgeon  General Surgery [phone]      Your Vitals Were     BP Pulse Temp Resp Height Weight    107/60 (BP Location: Right arm, Patient Position: Lying, BP Method: Automatic) 57 98 °F (36.7 °C) (Oral) 20 5' 5.5" (1.664 m) 100.2 kg (221 lb)    Last Period SpO2 BMI          (LMP Unknown) 97% 36.22 kg/m2        Recent Lab Values        12/17/2014 9/21/2015                       10:42 AM  3:14 PM          A1C 5.4 5.5                     Pending Labs     Order Current Status    Specimen to Pathology - Surgery Collected (04/25/17 1030)      Allergies as of 4/25/2017        Reactions    Bactrim [Sulfamethoxazole-trimethoprim]     Pt was told when she was younger    Pcn [Penicillins]     Pt was told when she was younger      [facility]     [facility] Nurse Care Line - 24/7 Assistance  Unless otherwise directed by your provider, please contact [facility], our nurse care line that is available for 24/7 assistance.     Registered nurses in the [facility] provide clinical advisement, health education, appointment booking, and other advisory services.  Call for this free service at [phone].        Advance Directives     An advance directive is a document which, in the event you are no longer able to make decisions for " yourself, tells your healthcare team what kind of treatment you do or do not want to receive, or who you would like to make those decisions for you.  If you do not currently have an advance directive, [facility] encourages you to create one.  For more information call:  [phone]), [phone] ([phone]),  or log on to [email or web address].        Smoking Cessation     If you would like to quit smoking:   You may be eligible for free services if you are a [place] resident and started smoking cigarettes before September 1, 1988.  Call the Smoking Cessation Trust ([facility]) toll free at (603) 626-5552 or (260) 248-4209.   Call 8-016-QUIT-NOW if you do not meet the above criteria.   Contact us via email: [email or web address]   View our website for more information: [email or web address]        Language Assistance Services     ATTENTION: Language assistance services are available, free of charge. Please call [phone].      ATENCIÓN: Si habla español, tiene a ibarra disposición servicios gratuitos de asistencia lingüística. Llame al [phone].     CHÚ Ý: N?u b?n nói Ti?ng Vi?t, có các d?ch v? h? tr? ngôn ng? mi?n phí dành cho b?n. G?i s? [phone].         [facility] complies with applicable Federal civil rights laws and does not discriminate on the basis of race, color, national origin, age, disability, or sex.

## 2017-04-25 ENCOUNTER — ANESTHESIA EVENT (OUTPATIENT)
Dept: SURGERY | Facility: HOSPITAL | Age: 27
End: 2017-04-25
Payer: MEDICAID

## 2017-04-25 ENCOUNTER — ANESTHESIA (OUTPATIENT)
Dept: SURGERY | Facility: HOSPITAL | Age: 27
End: 2017-04-25
Payer: MEDICAID

## 2017-04-25 VITALS
TEMPERATURE: 98 F | OXYGEN SATURATION: 97 % | DIASTOLIC BLOOD PRESSURE: 60 MMHG | SYSTOLIC BLOOD PRESSURE: 107 MMHG | HEART RATE: 57 BPM | BODY MASS INDEX: 35.52 KG/M2 | HEIGHT: 66 IN | WEIGHT: 221 LBS | RESPIRATION RATE: 20 BRPM

## 2017-04-25 PROBLEM — K81.9 CHOLECYSTITIS: Status: ACTIVE | Noted: 2017-04-25

## 2017-04-25 LAB
ALBUMIN SERPL BCP-MCNC: 3 G/DL
ALP SERPL-CCNC: 56 U/L
ALT SERPL W/O P-5'-P-CCNC: 14 U/L
ANION GAP SERPL CALC-SCNC: 8 MMOL/L
AST SERPL-CCNC: 10 U/L
BASOPHILS # BLD AUTO: 0.03 K/UL
BASOPHILS NFR BLD: 0.3 %
BILIRUB SERPL-MCNC: 0.8 MG/DL
BUN SERPL-MCNC: 8 MG/DL
CALCIUM SERPL-MCNC: 8.1 MG/DL
CHLORIDE SERPL-SCNC: 107 MMOL/L
CO2 SERPL-SCNC: 24 MMOL/L
CREAT SERPL-MCNC: 0.6 MG/DL
DIFFERENTIAL METHOD: ABNORMAL
EOSINOPHIL # BLD AUTO: 0.7 K/UL
EOSINOPHIL NFR BLD: 7.5 %
ERYTHROCYTE [DISTWIDTH] IN BLOOD BY AUTOMATED COUNT: 14.6 %
EST. GFR  (AFRICAN AMERICAN): >60 ML/MIN/1.73 M^2
EST. GFR  (NON AFRICAN AMERICAN): >60 ML/MIN/1.73 M^2
GLUCOSE SERPL-MCNC: 105 MG/DL
HCT VFR BLD AUTO: 35.1 %
HGB BLD-MCNC: 11.4 G/DL
LYMPHOCYTES # BLD AUTO: 3.4 K/UL
LYMPHOCYTES NFR BLD: 38.7 %
MCH RBC QN AUTO: 27.3 PG
MCHC RBC AUTO-ENTMCNC: 32.5 %
MCV RBC AUTO: 84 FL
MONOCYTES # BLD AUTO: 0.7 K/UL
MONOCYTES NFR BLD: 7.4 %
NEUTROPHILS # BLD AUTO: 4 K/UL
NEUTROPHILS NFR BLD: 46 %
PLATELET # BLD AUTO: 168 K/UL
PMV BLD AUTO: 10.6 FL
POTASSIUM SERPL-SCNC: 3.6 MMOL/L
PROT SERPL-MCNC: 6.1 G/DL
RBC # BLD AUTO: 4.18 M/UL
SODIUM SERPL-SCNC: 139 MMOL/L
WBC # BLD AUTO: 8.79 K/UL

## 2017-04-25 PROCEDURE — 36000709 HC OR TIME LEV III EA ADD 15 MIN: Performed by: SURGERY

## 2017-04-25 PROCEDURE — 27201423 OPTIME MED/SURG SUP & DEVICES STERILE SUPPLY: Performed by: SURGERY

## 2017-04-25 PROCEDURE — 88304 TISSUE EXAM BY PATHOLOGIST: CPT | Mod: 26,,, | Performed by: PATHOLOGY

## 2017-04-25 PROCEDURE — 94761 N-INVAS EAR/PLS OXIMETRY MLT: CPT | Mod: 59

## 2017-04-25 PROCEDURE — 37000008 HC ANESTHESIA 1ST 15 MINUTES: Performed by: SURGERY

## 2017-04-25 PROCEDURE — 80053 COMPREHEN METABOLIC PANEL: CPT

## 2017-04-25 PROCEDURE — 25000003 PHARM REV CODE 250: Performed by: SURGERY

## 2017-04-25 PROCEDURE — 63600175 PHARM REV CODE 636 W HCPCS: Performed by: NURSE ANESTHETIST, CERTIFIED REGISTERED

## 2017-04-25 PROCEDURE — 25000003 PHARM REV CODE 250: Performed by: NURSE ANESTHETIST, CERTIFIED REGISTERED

## 2017-04-25 PROCEDURE — 36000708 HC OR TIME LEV III 1ST 15 MIN: Performed by: SURGERY

## 2017-04-25 PROCEDURE — 25000003 PHARM REV CODE 250: Performed by: COLON & RECTAL SURGERY

## 2017-04-25 PROCEDURE — G0378 HOSPITAL OBSERVATION PER HR: HCPCS

## 2017-04-25 PROCEDURE — 85025 COMPLETE CBC W/AUTO DIFF WBC: CPT

## 2017-04-25 PROCEDURE — 63600175 PHARM REV CODE 636 W HCPCS: Performed by: COLON & RECTAL SURGERY

## 2017-04-25 PROCEDURE — 63600175 PHARM REV CODE 636 W HCPCS: Performed by: ANESTHESIOLOGY

## 2017-04-25 PROCEDURE — 88304 TISSUE EXAM BY PATHOLOGIST: CPT | Performed by: PATHOLOGY

## 2017-04-25 PROCEDURE — 71000039 HC RECOVERY, EACH ADD'L HOUR: Performed by: SURGERY

## 2017-04-25 PROCEDURE — 71000033 HC RECOVERY, INTIAL HOUR: Performed by: SURGERY

## 2017-04-25 PROCEDURE — 37000009 HC ANESTHESIA EA ADD 15 MINS: Performed by: SURGERY

## 2017-04-25 PROCEDURE — 36415 COLL VENOUS BLD VENIPUNCTURE: CPT

## 2017-04-25 RX ORDER — SUCCINYLCHOLINE CHLORIDE 20 MG/ML
INJECTION INTRAMUSCULAR; INTRAVENOUS
Status: DISCONTINUED | OUTPATIENT
Start: 2017-04-25 | End: 2017-04-25

## 2017-04-25 RX ORDER — CLINDAMYCIN PHOSPHATE 900 MG/50ML
INJECTION, SOLUTION INTRAVENOUS
Status: DISCONTINUED | OUTPATIENT
Start: 2017-04-25 | End: 2017-04-25

## 2017-04-25 RX ORDER — DEXAMETHASONE SODIUM PHOSPHATE 4 MG/ML
INJECTION, SOLUTION INTRA-ARTICULAR; INTRALESIONAL; INTRAMUSCULAR; INTRAVENOUS; SOFT TISSUE
Status: DISCONTINUED | OUTPATIENT
Start: 2017-04-25 | End: 2017-04-25

## 2017-04-25 RX ORDER — ONDANSETRON 8 MG/1
8 TABLET, ORALLY DISINTEGRATING ORAL EVERY 6 HOURS PRN
Qty: 20 TABLET | Refills: 0 | Status: SHIPPED | OUTPATIENT
Start: 2017-04-25 | End: 2017-05-01

## 2017-04-25 RX ORDER — ONDANSETRON 2 MG/ML
INJECTION INTRAMUSCULAR; INTRAVENOUS
Status: DISCONTINUED | OUTPATIENT
Start: 2017-04-25 | End: 2017-04-25

## 2017-04-25 RX ORDER — LIDOCAINE HCL/PF 100 MG/5ML
SYRINGE (ML) INTRAVENOUS
Status: DISCONTINUED | OUTPATIENT
Start: 2017-04-25 | End: 2017-04-25

## 2017-04-25 RX ORDER — GLYCOPYRROLATE 0.2 MG/ML
INJECTION INTRAMUSCULAR; INTRAVENOUS
Status: DISCONTINUED | OUTPATIENT
Start: 2017-04-25 | End: 2017-04-25

## 2017-04-25 RX ORDER — OXYCODONE AND ACETAMINOPHEN 5; 325 MG/1; MG/1
1 TABLET ORAL
Status: DISCONTINUED | OUTPATIENT
Start: 2017-04-25 | End: 2017-04-25 | Stop reason: HOSPADM

## 2017-04-25 RX ORDER — PROPOFOL 10 MG/ML
VIAL (ML) INTRAVENOUS
Status: DISCONTINUED | OUTPATIENT
Start: 2017-04-25 | End: 2017-04-25

## 2017-04-25 RX ORDER — HYDROMORPHONE HYDROCHLORIDE 2 MG/ML
INJECTION, SOLUTION INTRAMUSCULAR; INTRAVENOUS; SUBCUTANEOUS
Status: DISCONTINUED
Start: 2017-04-25 | End: 2017-04-25 | Stop reason: HOSPADM

## 2017-04-25 RX ORDER — BUPIVACAINE HYDROCHLORIDE 2.5 MG/ML
INJECTION, SOLUTION EPIDURAL; INFILTRATION; INTRACAUDAL
Status: DISCONTINUED | OUTPATIENT
Start: 2017-04-25 | End: 2017-04-25 | Stop reason: HOSPADM

## 2017-04-25 RX ORDER — ROCURONIUM BROMIDE 10 MG/ML
INJECTION, SOLUTION INTRAVENOUS
Status: DISCONTINUED | OUTPATIENT
Start: 2017-04-25 | End: 2017-04-25

## 2017-04-25 RX ORDER — OXYCODONE AND ACETAMINOPHEN 5; 325 MG/1; MG/1
1 TABLET ORAL EVERY 4 HOURS PRN
Qty: 30 TABLET | Refills: 0 | Status: SHIPPED | OUTPATIENT
Start: 2017-04-25 | End: 2017-05-01

## 2017-04-25 RX ORDER — SODIUM CHLORIDE, SODIUM LACTATE, POTASSIUM CHLORIDE, CALCIUM CHLORIDE 600; 310; 30; 20 MG/100ML; MG/100ML; MG/100ML; MG/100ML
INJECTION, SOLUTION INTRAVENOUS CONTINUOUS PRN
Status: DISCONTINUED | OUTPATIENT
Start: 2017-04-25 | End: 2017-04-25

## 2017-04-25 RX ORDER — ACETAMINOPHEN 10 MG/ML
INJECTION, SOLUTION INTRAVENOUS
Status: DISCONTINUED | OUTPATIENT
Start: 2017-04-25 | End: 2017-04-25

## 2017-04-25 RX ORDER — KETOROLAC TROMETHAMINE 30 MG/ML
INJECTION, SOLUTION INTRAMUSCULAR; INTRAVENOUS
Status: DISCONTINUED | OUTPATIENT
Start: 2017-04-25 | End: 2017-04-25

## 2017-04-25 RX ORDER — HYDROMORPHONE HYDROCHLORIDE 2 MG/ML
0.4 INJECTION, SOLUTION INTRAMUSCULAR; INTRAVENOUS; SUBCUTANEOUS EVERY 5 MIN PRN
Status: DISCONTINUED | OUTPATIENT
Start: 2017-04-25 | End: 2017-04-25 | Stop reason: HOSPADM

## 2017-04-25 RX ORDER — MIDAZOLAM HYDROCHLORIDE 1 MG/ML
INJECTION, SOLUTION INTRAMUSCULAR; INTRAVENOUS
Status: DISCONTINUED | OUTPATIENT
Start: 2017-04-25 | End: 2017-04-25

## 2017-04-25 RX ORDER — CEFAZOLIN SODIUM 2 G/50ML
2 SOLUTION INTRAVENOUS
Status: DISCONTINUED | OUTPATIENT
Start: 2017-04-25 | End: 2017-04-25

## 2017-04-25 RX ORDER — NEOSTIGMINE METHYLSULFATE 1 MG/ML
INJECTION, SOLUTION INTRAVENOUS
Status: DISCONTINUED | OUTPATIENT
Start: 2017-04-25 | End: 2017-04-25

## 2017-04-25 RX ORDER — FENTANYL CITRATE 50 UG/ML
INJECTION, SOLUTION INTRAMUSCULAR; INTRAVENOUS
Status: DISCONTINUED | OUTPATIENT
Start: 2017-04-25 | End: 2017-04-25

## 2017-04-25 RX ORDER — SODIUM CHLORIDE 0.9 % (FLUSH) 0.9 %
3 SYRINGE (ML) INJECTION EVERY 8 HOURS
Status: DISCONTINUED | OUTPATIENT
Start: 2017-04-25 | End: 2017-04-25 | Stop reason: HOSPADM

## 2017-04-25 RX ORDER — ONDANSETRON 2 MG/ML
4 INJECTION INTRAMUSCULAR; INTRAVENOUS DAILY PRN
Status: DISCONTINUED | OUTPATIENT
Start: 2017-04-25 | End: 2017-04-25 | Stop reason: HOSPADM

## 2017-04-25 RX ORDER — SODIUM CHLORIDE 0.9 % (FLUSH) 0.9 %
3 SYRINGE (ML) INJECTION
Status: DISCONTINUED | OUTPATIENT
Start: 2017-04-25 | End: 2017-04-25 | Stop reason: HOSPADM

## 2017-04-25 RX ADMIN — HEPARIN SODIUM 5000 UNITS: 5000 INJECTION, SOLUTION INTRAVENOUS; SUBCUTANEOUS at 05:04

## 2017-04-25 RX ADMIN — HYDROMORPHONE HYDROCHLORIDE 0.4 MG: 2 INJECTION INTRAMUSCULAR; INTRAVENOUS; SUBCUTANEOUS at 11:04

## 2017-04-25 RX ADMIN — MIDAZOLAM 2 MG: 1 INJECTION INTRAMUSCULAR; INTRAVENOUS at 09:04

## 2017-04-25 RX ADMIN — PROMETHAZINE HYDROCHLORIDE 6.25 MG: 25 INJECTION INTRAMUSCULAR; INTRAVENOUS at 01:04

## 2017-04-25 RX ADMIN — PROPOFOL 150 MG: 10 INJECTION, EMULSION INTRAVENOUS at 09:04

## 2017-04-25 RX ADMIN — SUCCINYLCHOLINE CHLORIDE 120 MG: 20 INJECTION, SOLUTION INTRAMUSCULAR; INTRAVENOUS at 09:04

## 2017-04-25 RX ADMIN — CLINDAMYCIN PHOSPHATE 900 MG: 18 INJECTION, SOLUTION INTRAVENOUS at 09:04

## 2017-04-25 RX ADMIN — HYDROCODONE BITARTRATE AND ACETAMINOPHEN 1 TABLET: 10; 325 TABLET ORAL at 01:04

## 2017-04-25 RX ADMIN — ROCURONIUM BROMIDE 5 MG: 10 INJECTION, SOLUTION INTRAVENOUS at 09:04

## 2017-04-25 RX ADMIN — DEXAMETHASONE SODIUM PHOSPHATE 8 MG: 4 INJECTION, SOLUTION INTRAMUSCULAR; INTRAVENOUS at 09:04

## 2017-04-25 RX ADMIN — ONDANSETRON 8 MG: 2 INJECTION, SOLUTION INTRAMUSCULAR; INTRAVENOUS at 10:04

## 2017-04-25 RX ADMIN — LIDOCAINE HYDROCHLORIDE 100 MG: 20 INJECTION, SOLUTION INTRAVENOUS at 09:04

## 2017-04-25 RX ADMIN — FENTANYL CITRATE 50 MCG: 50 INJECTION, SOLUTION INTRAMUSCULAR; INTRAVENOUS at 10:04

## 2017-04-25 RX ADMIN — GLYCOPYRROLATE 0.6 MG: 0.2 INJECTION, SOLUTION INTRAMUSCULAR; INTRAVENOUS at 10:04

## 2017-04-25 RX ADMIN — ROCURONIUM BROMIDE 30 MG: 10 INJECTION, SOLUTION INTRAVENOUS at 09:04

## 2017-04-25 RX ADMIN — KETOROLAC TROMETHAMINE 30 MG: 30 INJECTION, SOLUTION INTRAMUSCULAR; INTRAVENOUS at 10:04

## 2017-04-25 RX ADMIN — FENTANYL CITRATE 100 MCG: 50 INJECTION, SOLUTION INTRAMUSCULAR; INTRAVENOUS at 09:04

## 2017-04-25 RX ADMIN — SODIUM CHLORIDE, SODIUM LACTATE, POTASSIUM CHLORIDE, AND CALCIUM CHLORIDE: .6; .31; .03; .02 INJECTION, SOLUTION INTRAVENOUS at 09:04

## 2017-04-25 RX ADMIN — DEXTROSE MONOHYDRATE, SODIUM CHLORIDE, AND POTASSIUM CHLORIDE: 50; 9; 1.49 INJECTION, SOLUTION INTRAVENOUS at 05:04

## 2017-04-25 RX ADMIN — ACETAMINOPHEN 1000 MG: 10 INJECTION, SOLUTION INTRAVENOUS at 09:04

## 2017-04-25 RX ADMIN — NEOSTIGMINE METHYLSULFATE 4 MG: 1 INJECTION INTRAVENOUS at 10:04

## 2017-04-25 NOTE — OP NOTE
DATE OF PROCEDURE:  04/25/2017    PREOPERATIVE DIAGNOSIS:  Acute calculous cholecystitis.    POSTOPERATIVE DIAGNOSIS:  Acute calculous cholecystitis.    PROCEDURE:  Laparoscopic cholecystectomy.    ANESTHESIA:  General endotracheal anesthesia.    SURGEON:  Ry Rhodes M.D.    SURGERY RESIDENT:  Dr. Randy Huston.    BLOOD LOSS:  Minimal.    COMPLICATIONS:  None.    DISPOSITION:  To Recovery Room.    SPECIMEN:  Gallbladder.    HISTORY AND INDICATION:  This is a 27-year-old female, came to the Emergency   Room with severe right upper quadrant and upper abdominal pain.  The patient had   a right upper quadrant tenderness and she had a workup which showed evidence of   acute cholecystitis.  The patient was admitted and was placed on IV antibiotics   and was hydrated.  Since there is a confirmed evidence of cholecystitis, I   talked to her about having surgery.  I went over with her about the risks and   benefits, the chance of bleeding, infection, DVT, PE, MI, bile duct injury   requiring more procedures.  After going over all the risk and benefits, consent   was obtained.    PROCEDURE IN DETAIL:  The patient was brought to the Operating Room after making   sure she has voided.  She was then placed in supine position and was sedated   and intubated.  Her abdomen was prepped and draped in the usual sterile manner.    A time-out was given to verify the area of the patient and the procedure.    About 2 inches above the umbilicus, the patient was given local infiltration of   local anesthesia followed by an incision and then, a Veress needle was inserted   into the abdomen.  Abdomen was insufflated with CO2.  After adequate   insufflation, a 5-mm trocar was inserted into the abdomen and under direct   vision.  Under direct vision, an 11-mm trocar in the epigastric area and 2 right   upper quadrants were placed under direct vision.  Following this, the balloons   were insufflated and the patient was then placed in the  reversed Trendelenburg   position with the right side elevated.  The gallbladder was found to be acutely   inflamed.  The fundus of the gallbladder was grasped and was retracted   superiorly to the right side.  The omental adhesions were taken down.  The   Ira's pouch was retracted superiorly to the right side.  The cystic duct   and cystic artery was dissected out, isolated and the critical view was well   appreciated.  The cystic duct was clipped proximally x3 with 3 clips and once   distally and was transected in between.  The cystic artery was taken down with   clips proximally and cut in between the clips.  The gallbladder was then   carefully dissected out from the liver bed.  Meticulous hemostasis was   accomplished everywhere.  The gallbladder was then placed in an Endobag and was   taken out.  Adequate hemostasis was accomplished and then the ports were all   taken out.  The fascia was closed with Vicryl suture at the epigastric site.    Marcaine and Exparel were given, followed by closure of the skin using 4-0   Monocryl.  The patient was stable, was extubated, taken to Recovery in stable   condition.      JORDAN  dd: 04/25/2017 10:43:28 (CDT)  td: 04/25/2017 13:18:23 (CDT)  Doc ID   #[id number]  Job ID #[id number]    CC:

## 2017-04-25 NOTE — PLAN OF CARE
Future Appointments  Date Time Provider Department Center   5/1/2017 10:15 AM Carmelo Celestin MD [facility]           04/25/17 1717   Final Note   Assessment Type Final Discharge Note   Discharge Disposition Home   Discharge planning education complete? Yes   What phone number can be called within the next 1-3 days to see how you are doing after discharge? [phone]   Hospital Follow Up  Appt(s) scheduled? Yes   Discharge plans and expectations educations in teach back method with documentation complete? Yes   Offered [facility] -- Bedside Delivery? Yes   Discharge/Hospital Encounter Summary to (non[facility]) PCP Yes   Referral to Outpatient Case Management complete? n/a   Referral to / orders for Home Health Complete? n/a   30 day supply of medicines given at discharge, if documented non-compliance / non-adherence? n/a   Any social issues identified prior to discharge? n/a   Did you assess the readiness or willingness of the family or caregiver to support self management of care? Yes   Right Care Referral Info   Post Acute Recommendation No Care

## 2017-04-25 NOTE — TRANSFER OF CARE
"Anesthesia Transfer of Care Note    Patient: Therese De Leon    Procedure(s) Performed: Procedure(s) (LRB):  CHOLECYSTECTOMY-LAPAROSCOPIC (N/A)    Patient location: PACU    Anesthesia Type: general    Transport from OR: Transported from OR on 6-10 L/min O2 by face mask with adequate spontaneous ventilation    Post pain: adequate analgesia    Post assessment: no apparent anesthetic complications and tolerated procedure well    Post vital signs: stable    Level of consciousness: awake, alert and oriented    Nausea/Vomiting: no nausea/vomiting    Complications: none          Last vitals:   Visit Vitals    BP (!) 93/54    Pulse 63    Temp 36.7 °C (98.1 °F) (Oral)    Resp 18    Ht 5' 5.5" (1.664 m)    Wt 100.2 kg (221 lb)    LMP  (LMP Unknown)    SpO2 99%    Breastfeeding No    BMI 36.22 kg/m2     "

## 2017-04-25 NOTE — ANESTHESIA PREPROCEDURE EVALUATION
04/25/2017  Therese De Leon is a 27 y.o., female presents for lap estee.    Anesthesia Evaluation         Review of Systems  Anesthesia Hx:  No problems with previous Anesthesia   Social:  Non-Smoker, No Alcohol Use    Cardiovascular:  Cardiovascular Normal Exercise tolerance: good     Pulmonary:  Pulmonary Normal    Hepatic/GI:   GERD    Neurological:  Neurology Normal    Endocrine:  Endocrine Normal        Physical Exam  General:  Obesity    Airway/Jaw/Neck:  Airway Findings: Mouth Opening: Normal Mallampati: III  TM Distance: Normal, at least 6 cm      Dental:  Dental Findings: In tact   Chest/Lungs:  Chest/Lungs Clear    Heart/Vascular:  Heart Findings: Normal            Anesthesia Plan  Type of Anesthesia, risks & benefits discussed:  Anesthesia Type:  general  Patient's Preference:   Intra-op Monitoring Plan:   Intra-op Monitoring Plan Comments:   Post Op Pain Control Plan:   Post Op Pain Control Plan Comments:   Induction:    Beta Blocker:  Patient is not currently on a Beta-Blocker (No further documentation required).       Informed Consent: Patient understands risks and agrees with Anesthesia plan.  Questions answered. Anesthesia consent signed with patient.  ASA Score: 2     Day of Surgery Review of History & Physical: I have interviewed and examined the patient. I have reviewed the patient's H&P dated:  There are no significant changes.  H&P update referred to the surgeon.         Ready For Surgery From Anesthesia Perspective.

## 2017-04-25 NOTE — PLAN OF CARE
Problem: Patient Care Overview  Goal: Plan of Care Review  Outcome: Ongoing (interventions implemented as appropriate)  Pt's SpO2 99% on RA. No respiratory distress noted. Will continue to monitor SpO2.

## 2017-04-25 NOTE — ANESTHESIA POSTPROCEDURE EVALUATION
"Anesthesia Post Evaluation    Patient: Therese De Leon    Procedure(s) Performed: Procedure(s) (LRB):  CHOLECYSTECTOMY-LAPAROSCOPIC (N/A)    Final Anesthesia Type: general  Patient location during evaluation: PACU  Patient participation: Yes- Able to Participate  Level of consciousness: awake and alert  Post-procedure vital signs: reviewed and stable  Pain management: adequate  Airway patency: patent  PONV status at discharge: No PONV  Anesthetic complications: no      Cardiovascular status: hemodynamically stable  Respiratory status: spontaneous ventilation  Hydration status: euvolemic  Follow-up not needed.        Visit Vitals    BP (!) 104/59    Pulse (!) 54    Temp 36.7 °C (98.1 °F) (Oral)    Resp 16    Ht 5' 5.5" (1.664 m)    Wt 100.2 kg (221 lb)    LMP  (LMP Unknown)    SpO2 100%    Breastfeeding No    BMI 36.22 kg/m2       Pain/Chandler Score: Pain Assessment Performed: Yes (4/25/2017 11:15 AM)  Presence of Pain: denies (4/25/2017 11:15 AM)  Pain Rating Prior to Med Admin: 8 (4/25/2017 11:48 AM)  Chandler Score: 9 (4/25/2017 11:15 AM)      "

## 2017-04-25 NOTE — PROGRESS NOTES
patient NPO at this time. IV fluids infusing as ordered. Patient with no hair on abd, no clipping necessary. Hibiclens bottle given to patient with instructions for using. verbalizes understanding

## 2017-04-25 NOTE — PROGRESS NOTES
Patient is discharged home per MD.  Discharge instructions on medications, signs and symptoms when to call the MD, and follow-up visit are given to the patient.  Patient verbalized complete understanding on the above discharge instructions.  Prescriptions on Zofran and Percocet given to patient.  Patient prefers to walk to Lobby, accompanied by Nursing staff.  Voiced no other concerns.  Safety maintained.

## 2017-04-25 NOTE — PLAN OF CARE
Problem: Patient Care Overview  Goal: Plan of Care Review  Outcome: Ongoing (interventions implemented as appropriate)  Patient AAox4. No distress noted overnight. Hydrocodone given this AM with sip of water. phenergan given for c/o nausea. Relief felt per patient. NPO since Midnight. hibiclens bath performed to abd per patient. IV fluids infusing to PIV. Patient walking without assist. Hat placed in toilet for accurate measure of urine. teds placed to BLE. All undergaments removed. UPT negative, done in ER.  Safety ensured. Call light within reach

## 2017-04-25 NOTE — PROGRESS NOTES
[facility] - Neuroendocrine Surgery Service  Daily Progress Note     HD#1  POD#* No surgery date entered *    Subjective  LANCE. Pain improved but persists at RUQ. NPO for procedure.      Scheduled Meds:   heparin (porcine)  5,000 Units Subcutaneous Q8H       Continuous Infusions:   dextrose 5 % and 0.9 % NaCl with KCl 20 mEq 125 mL/hr at 04/25/17 0524       PRN Meds:acetaminophen, ceFAZolin (ANCEF) IVPB, diphenhydrAMINE, hydrocodone-acetaminophen 10-325mg, hydrocodone-acetaminophen 5-325mg, HYDROmorphone, HYDROmorphone, metoclopramide HCl, morphine, ondansetron, ondansetron, promethazine (PHENERGAN) IVPB, ramelteon     Objective  Temp:  [97.6 °F (36.4 °C)-98.5 °F (36.9 °C)] 97.9 °F (36.6 °C)  Pulse:  [52-72] 59  Resp:  [16] 16  SpO2:  [99 %-100 %] 99 %  BP: ()/(51-78) 92/51     I/O last 3 completed shifts:  In: 1334.6 [P.O.:420; I.V.:864.6; IV Piggyback:50]  Out: 900 [Urine:900]        GEN: NAD  RESP: AUDREY  CV: Reg rate  ABD: +TTP at RUQ, soft, ND     Labs  Recent Labs      04/24/17   1445  04/25/17   0423   WBC  8.43  8.79   HGB  13.6  11.4*   HCT  40.8  35.1*   PLT  195  168     Recent Labs      04/24/17   1445  04/25/17   0423   NA  138  139   K  4.2  3.6   CL  104  107   CO2  25  24   BUN  13  8   CREATININE  0.7  0.6   GLU  91  105   CALCIUM  9.5  8.1*   PROT  8.1  6.1   ALBUMIN  3.9  3.0*   BILITOT  0.7  0.8   AST  15  10   ALKPHOS  71  56   ALT  21  14       Imaging  -     Assessment/Plan  27F with symptomatic cholelithiasis  -Full PARQ held, consent obtained  -To OR today for lap estee     Randy Huston MD  [facility] General Surgery, PGY2  c [phone]  4/25/2017  8:25 AM      For surgery  Risks explained in detail    Will do katty fontanez today and dc home today

## 2017-04-25 NOTE — OP NOTE
Preop: acute cholecytitis  [postop: same  Procedure: katty Rhodes/ rad[place]  Speciemn: GB  Complications : none  To PACU    ebl none    [id number]

## 2017-04-25 NOTE — PLAN OF CARE
Future Appointments  Date Time Provider Department Center   5/1/2017 10:15 AM Carmelo Celestin MD [facility]           04/25/17 1715   Discharge Assessment   Assessment Type Discharge Planning Assessment   Assessment information obtained from? Medical Record   Communicated expected length of stay with patient/caregiver yes   Prior to hospitilization cognitive status: Alert/Oriented   Prior to hospitalization functional status: Independent   Current cognitive status: Alert/Oriented   Current Functional Status: Independent   Arrived From home or self-care   Lives With parent(s)   Able to Return to Prior Arrangements yes   Is patient able to care for self after discharge? Yes   Patient's perception of discharge disposition home or selfcare   Readmission Within The Last 30 Days no previous admission in last 30 days   Patient currently being followed by outpatient case management? No   Patient currently receives home health services? No   Does the patient currently use HME? No   Equipment Currently Used at Home none   Do you have any problems affording any of your prescribed medications? No   Is the patient taking medications as prescribed? yes   Do you have any financial concerns preventing you from receiving the healthcare you need? No   Does the patient have transportation to healthcare appointments? No   On Dialysis? No   Are there any open cases? No   Discharge Plan A Home with family   Discharge Plan B Home with family   Patient/Family In Agreement With Plan yes

## 2017-05-01 ENCOUNTER — OFFICE VISIT (OUTPATIENT)
Dept: NEUROLOGY | Facility: HOSPITAL | Age: 27
End: 2017-05-01
Attending: SURGERY
Payer: MEDICAID

## 2017-05-01 VITALS
BODY MASS INDEX: 36.32 KG/M2 | SYSTOLIC BLOOD PRESSURE: 110 MMHG | TEMPERATURE: 99 F | WEIGHT: 218 LBS | HEART RATE: 90 BPM | DIASTOLIC BLOOD PRESSURE: 79 MMHG | HEIGHT: 65 IN

## 2017-05-01 DIAGNOSIS — Z09 POSTOP CHECK: Primary | ICD-10-CM

## 2017-05-01 PROCEDURE — 99213 OFFICE O/P EST LOW 20 MIN: CPT | Performed by: SURGERY

## 2017-05-01 NOTE — MR AVS SNAPSHOT
[facility]  [address] [place] [address]  Phone: [phone]  Fax: [phone]                  Therese De Leon   2017 10:15 AM   Office Visit    Description:  Female : 1990   Provider:  Carmelo Celestin MD   Department:  [facility]           Reason for Visit     Post-op Evaluation           Diagnoses this Visit        Comments    Postop check    -  Primary            To Do List           Future Appointments        Provider Department Dept Phone    2017 10:00 AM Carmelo Celestin MD [facility] [phone]      Goals (5 Years of Data)     None      Follow-Up and Disposition     Return in about 3 months (around 2017).    Follow-up and Disposition History      [facility]     [facility] Nurse Care Line -  Assistance  Unless otherwise directed by your provider, please contact [facility], our nurse care line that is available for  assistance.     Registered nurses in the [facility] provide: appointment scheduling, clinical advisement, health education, and other advisory services.  Call: [phone] (toll free)               Medications           Message regarding Medications     Verify the changes and/or additions to your medication regime listed below are the same as discussed with your clinician today.  If any of these changes or additions are incorrect, please notify your healthcare provider.        STOP taking these medications     ondansetron (ZOFRAN-ODT) 8 MG TbDL Take 1 tablet (8 mg total) by mouth every 6 (six) hours as needed (nausea).    oxycodone-acetaminophen (PERCOCET) 5-325 mg per tablet Take 1 tablet by mouth every 4 (four) hours as needed (moderate pain 2-5/10 pain scale).           Verify that the below list of medications is an accurate representation of the medications you are currently taking.  If none reported, the list may be blank. If incorrect, please  "contact your healthcare provider. Carry this list with you in case of emergency.           Current Medications     MIRENA 20 mcg/24 hr (5 years) IUD            Clinical Reference Information           Your Vitals Were     BP Pulse Temp Height Weight Last Period    110/79 90 98.8 °F (37.1 °C) (Oral) 5' 5" (1.651 m) 98.9 kg (218 lb) (LMP Unknown)    BMI                36.28 kg/m2          Blood Pressure          Most Recent Value    BP  110/79      Allergies as of 5/1/2017     Bactrim [Sulfamethoxazole-trimethoprim]    Pcn [Penicillins]      Immunizations Administered on Date of Encounter - 5/1/2017     None      Instructions    Return to clinic        Smoking Cessation     If you would like to quit smoking:   You may be eligible for free services if you are a [place] resident and started smoking cigarettes before September 1, 1988.  Call the Smoking Cessation Trust ([facility]) toll free at [phone]) [phone] or [phone].   Call 1-800-QUIT-NOW if you do not meet the above criteria.   Contact us via email: [email or web address]   View our website for more information: [email or web address]        Language Assistance Services     ATTENTION: Language assistance services are available, free of charge. Please call [phone].      ATENCIÓN: Si habla abelinoañol, tiene a ibarra disposición servicios gratuitos de asistencia lingüística. Llame al [phone].     CHÚ Ý: N?u b?n nói Ti?ng Vi?t, có các d?ch v? h? tr? ngôn ng? mi?n phí dành cho b?n. G?i s? [phone].         [facility] complies with applicable Federal civil rights laws and does not discriminate on the basis of race, color, national origin, age, disability, or sex.        "

## 2017-05-01 NOTE — DISCHARGE SUMMARY
[facility]  Discharge Summary      Admit Date: 4/24/2017    Discharge Date and Time: 4/25/2017  2:20 PM    Attending Physician: Dhaval Rhodes MD    Reason for Admission: Cholecystitis    Procedures Performed: Procedure(s) (LRB):  CHOLECYSTECTOMY-LAPAROSCOPIC (N/A)    Hospital Course (synopsis of major diagnoses, care, treatment, and services provided during the course of the hospital stay): Seen and evaluated in ED. Found to have evidence of cholelithiasis with significant acute pain. Underwent uncomplicated lap cholecystectomy. Recovered uneventfully. Discharged home on POD1.     Plan: f/u 2 weeks with Dhaval Rhodes MD     Consults: none    Significant Diagnostic Studies: Labwork    Final Diagnoses:    Principal Problem: Calculus of gallbladder without cholecystitis without obstruction   Secondary Diagnoses:   Active Hospital Problems    Diagnosis  POA    *Calculus of gallbladder without cholecystitis without obstruction [K80.20]  Yes    Cholecystitis [K81.9]  Yes      Resolved Hospital Problems    Diagnosis Date Resolved POA   No resolved problems to display.       Discharged Condition: good    Disposition: Home or Self Care    Follow Up/Patient Instructions:     Medications:  Reconciled Home Medications:   Discharge Medication List as of 4/25/2017  2:13 PM      START taking these medications    Details   ondansetron (ZOFRAN-ODT) 8 MG TbDL Take 1 tablet (8 mg total) by mouth every 6 (six) hours as needed (nausea)., Starting 4/25/2017, Until Discontinued, Print         CONTINUE these medications which have CHANGED    Details   oxycodone-acetaminophen (PERCOCET) 5-325 mg per tablet Take 1 tablet by mouth every 4 (four) hours as needed (moderate pain 2-5/10 pain scale)., Starting 4/25/2017, Until Discontinued, Print         CONTINUE these medications which have NOT CHANGED    Details   MIRENA 20 mcg/24 hr (5 years) IUD Starting 5/11/2016, Until Discontinued, Historical Med          STOP taking these medications       hydrocodone-acetaminophen 5-325mg (NORCO) 5-325 mg per tablet Comments:   Reason for Stopping:             No discharge procedures on file.  Follow-up Information     Follow up with Carmelo Celestin MD On 5/1/2017.    Specialty:  General Surgery    Why:  @10:15am    Contact information:    [address]  [address]  [place] [address]  [phone]

## 2017-08-06 PROCEDURE — 99284 EMERGENCY DEPT VISIT MOD MDM: CPT | Mod: 25

## 2017-08-07 ENCOUNTER — HOSPITAL ENCOUNTER (EMERGENCY)
Facility: HOSPITAL | Age: 27
Discharge: HOME | End: 2017-08-07
Attending: EMERGENCY MEDICINE
Payer: MEDICAID

## 2017-08-07 VITALS
TEMPERATURE: 98 F | DIASTOLIC BLOOD PRESSURE: 81 MMHG | SYSTOLIC BLOOD PRESSURE: 133 MMHG | HEIGHT: 65 IN | OXYGEN SATURATION: 99 % | WEIGHT: 230 LBS | RESPIRATION RATE: 17 BRPM | BODY MASS INDEX: 38.32 KG/M2 | HEART RATE: 86 BPM

## 2017-08-07 DIAGNOSIS — J18.9 PNEUMONIA OF BOTH LUNGS DUE TO INFECTIOUS ORGANISM, UNSPECIFIED PART OF LUNG: Primary | ICD-10-CM

## 2017-08-07 LAB
B-HCG UR QL: NEGATIVE
CTP QC/QA: YES

## 2017-08-07 PROCEDURE — 25000242 PHARM REV CODE 250 ALT 637 W/ HCPCS: Performed by: EMERGENCY MEDICINE

## 2017-08-07 PROCEDURE — 25000003 PHARM REV CODE 250: Performed by: EMERGENCY MEDICINE

## 2017-08-07 PROCEDURE — 94640 AIRWAY INHALATION TREATMENT: CPT

## 2017-08-07 PROCEDURE — 81025 URINE PREGNANCY TEST: CPT | Performed by: EMERGENCY MEDICINE

## 2017-08-07 RX ORDER — DOXYCYCLINE HYCLATE 100 MG
100 TABLET ORAL
Status: COMPLETED | OUTPATIENT
Start: 2017-08-07 | End: 2017-08-07

## 2017-08-07 RX ORDER — ALBUTEROL SULFATE 0.83 MG/ML
2.5 SOLUTION RESPIRATORY (INHALATION) ONCE
Status: COMPLETED | OUTPATIENT
Start: 2017-08-07 | End: 2017-08-07

## 2017-08-07 RX ORDER — DOXYCYCLINE 100 MG/1
100 CAPSULE ORAL 2 TIMES DAILY
Qty: 14 CAPSULE | Refills: 0 | Status: SHIPPED | OUTPATIENT
Start: 2017-08-07 | End: 2017-08-14

## 2017-08-07 RX ORDER — IPRATROPIUM BROMIDE AND ALBUTEROL SULFATE 2.5; .5 MG/3ML; MG/3ML
3 SOLUTION RESPIRATORY (INHALATION)
Status: COMPLETED | OUTPATIENT
Start: 2017-08-07 | End: 2017-08-07

## 2017-08-07 RX ADMIN — ALBUTEROL SULFATE 2.5 MG: 2.5 SOLUTION RESPIRATORY (INHALATION) at 03:08

## 2017-08-07 RX ADMIN — IPRATROPIUM BROMIDE AND ALBUTEROL SULFATE 3 ML: .5; 3 SOLUTION RESPIRATORY (INHALATION) at 02:08

## 2017-08-07 RX ADMIN — DOXYCYCLINE HYCLATE 100 MG: 100 TABLET, COATED ORAL at 02:08

## 2017-08-07 NOTE — DISCHARGE INSTRUCTIONS
Take medications as directed.  Take an antihistamine like zyrtec/allegra/claritin daily.  Take Mucinex DM as directed on package.  You may take motrin 600mg every 6 hours for fever, headache, or pain.  See your doctor in 2 days for recheck.

## 2017-08-07 NOTE — ED NOTES
Pt. given urine specimen cup with towellet for collection of urinalysis with verbal instructions. Pt. verbalizes understanding.

## 2017-08-07 NOTE — ED NOTES
Pt. presents with cough, cold, congestion since Thursday. Denies sore throat. Fever up to 101. Denies n.v.d. Family in household being treated for cold. Hacking cough, coughing up green mucus. Denies SOB. Chest soreness from coughing. AAOx4. Appears uncomfortable. Denies hx of asthma. Currently everyday smoker. Took OTC cough and cold medicines without relief.

## 2017-08-07 NOTE — ED NOTES
Educated pt. on medication indication, AE, SE, reactions and expected outcomes. Pt. verbalizes understanding. Agrees to treatment plan. Denies allergy. Will continue to monitor.

## 2017-08-07 NOTE — ED NOTES
Pt. diplays persistent hacking cough. SOB on exertion. Auscultated breath sounds unchanged. Respirations unlabored at present time. Dr. Barry notified. MD gave verbal order to administer another neb treatment prior to discharge. Updated pt on plan of care. Pt. verbalizes understanding and agrees to plan.

## 2017-08-07 NOTE — ED PROVIDER NOTES
Encounter Date: 8/6/2017       History     Chief Complaint   Patient presents with    Nasal Congestion     nasal and chest congestion with cough x1 week.      27-year-old female presents with cough and congestion.  Symptoms have been constant and severe ×1 week with no relief with over-the-counter medications.  Tonight she got a coughing attack and found it hard to breathe so she came to the ER.  She reports 2 days of fever with a MAXIMUM TEMPERATURE of 101.4.  She has family members at home who are being treated for a sinus infection.  She also reports a history of pneumonia in the past and is concerned that she may have pneumonia.          Review of patient's allergies indicates:   Allergen Reactions    Bactrim [sulfamethoxazole-trimethoprim]      Pt was told when she was younger    Pcn [penicillins]      Pt was told when she was younger     Past Medical History:   Diagnosis Date    Acid reflux     Gallstones 11-12-15    Pneumonia 2014    2 episodes of pneumonia    Seasonal allergies      History reviewed. No pertinent surgical history.  Family History   Problem Relation Age of Onset    Asthma Mother     Diabetes Mother     Scoliosis Mother      Social History   Substance Use Topics    Smoking status: Current Every Day Smoker     Packs/day: 0.20     Types: Cigarettes    Smokeless tobacco: Never Used    Alcohol use No     Review of Systems   Constitutional: Positive for fever.   HENT: Positive for congestion and ear pain.    Respiratory: Positive for cough and shortness of breath.    All other systems reviewed and are negative.      Physical Exam     Initial Vitals [08/06/17 2358]   BP Pulse Resp Temp SpO2   133/81 86 20 98.4 °F (36.9 °C) 97 %      MAP       98.33         Physical Exam    Nursing note and vitals reviewed.  Constitutional: She appears well-developed and well-nourished. She is cooperative. No distress.   Sounds congested, rattling cough   HENT:   Head: Normocephalic and atraumatic.    Right Ear: Tympanic membrane normal.   Left Ear: Tympanic membrane normal.   Mouth/Throat: Oropharynx is clear and moist.   Eyes: Conjunctivae are normal.   Neck: Normal range of motion.   Cardiovascular: Normal rate, regular rhythm and normal heart sounds.   No murmur heard.  Pulmonary/Chest: Breath sounds normal. She has no wheezes. She has no rhonchi. She has no rales.   Musculoskeletal: Normal range of motion.   Neurological: She is alert and oriented to person, place, and time.   Skin: Skin is warm and dry.   Psychiatric: She has a normal mood and affect. Her behavior is normal.         ED Course   Procedures  Labs Reviewed   POCT URINE PREGNANCY             Medical Decision Making:   Clinical Tests:   Lab Tests: Ordered and Reviewed  Radiological Study: Ordered and Reviewed  ED Management:  Fever, cough and cold symptoms x 1 week.  PNA on CXR - no hypoxia or vomiting, looks well.  Treat outpatient with doxycycline.                   ED Course      Clinical Impression:   The encounter diagnosis was Pneumonia of both lungs due to infectious organism, unspecified part of lung.                           Cecilia Mclaughlin MD  09/05/17 4292

## 2017-08-19 ENCOUNTER — HOSPITAL ENCOUNTER (EMERGENCY)
Facility: HOSPITAL | Age: 27
Discharge: HOME | End: 2017-08-19
Attending: EMERGENCY MEDICINE
Payer: MEDICAID

## 2017-08-19 VITALS
WEIGHT: 230 LBS | RESPIRATION RATE: 16 BRPM | TEMPERATURE: 99 F | SYSTOLIC BLOOD PRESSURE: 126 MMHG | DIASTOLIC BLOOD PRESSURE: 70 MMHG | BODY MASS INDEX: 38.32 KG/M2 | HEART RATE: 75 BPM | OXYGEN SATURATION: 96 % | HEIGHT: 65 IN

## 2017-08-19 DIAGNOSIS — R05.9 COUGH: ICD-10-CM

## 2017-08-19 DIAGNOSIS — H66.91 RIGHT OTITIS MEDIA, UNSPECIFIED CHRONICITY, UNSPECIFIED OTITIS MEDIA TYPE: Primary | ICD-10-CM

## 2017-08-19 LAB
B-HCG UR QL: NEGATIVE
CTP QC/QA: YES

## 2017-08-19 PROCEDURE — 25000003 PHARM REV CODE 250: Performed by: PHYSICIAN ASSISTANT

## 2017-08-19 PROCEDURE — 99284 EMERGENCY DEPT VISIT MOD MDM: CPT

## 2017-08-19 RX ORDER — AZITHROMYCIN 250 MG/1
250 TABLET, FILM COATED ORAL DAILY
Qty: 6 TABLET | Refills: 0 | Status: SHIPPED | OUTPATIENT
Start: 2017-08-19 | End: 2017-08-19

## 2017-08-19 RX ORDER — CLINDAMYCIN HYDROCHLORIDE 150 MG/1
300 CAPSULE ORAL 3 TIMES DAILY
Qty: 42 CAPSULE | Refills: 0 | Status: SHIPPED | OUTPATIENT
Start: 2017-08-19 | End: 2017-08-19 | Stop reason: CLARIF

## 2017-08-19 RX ORDER — AZITHROMYCIN 250 MG/1
250 TABLET, FILM COATED ORAL DAILY
Qty: 6 TABLET | Refills: 0 | Status: SHIPPED | OUTPATIENT
Start: 2017-08-19 | End: 2017-08-22

## 2017-08-19 RX ORDER — IBUPROFEN 400 MG/1
800 TABLET ORAL
Status: COMPLETED | OUTPATIENT
Start: 2017-08-19 | End: 2017-08-19

## 2017-08-19 RX ADMIN — IBUPROFEN 800 MG: 400 TABLET, FILM COATED ORAL at 01:08

## 2017-08-19 NOTE — ED PROVIDER NOTES
Encounter Date: 8/19/2017       History     Chief Complaint   Patient presents with    Ear Problem     right ear pain and fullness since yesterday     The history is provided by the patient.   Otalgia   This is a new problem. The current episode started yesterday. There is pain in the right ear. The problem occurs constantly. The problem has been gradually worsening. The pain is at a severity of 6/10. Associated symptoms include hearing loss and cough. Pertinent negatives include no ear discharge, no rhinorrhea, no sore throat, no vomiting and no neck pain. Associated symptoms comments: Recently diagnosed with pneumonia .     Review of patient's allergies indicates:   Allergen Reactions    Bactrim [sulfamethoxazole-trimethoprim]      Pt was told when she was younger    Pcn [penicillins]      Pt was told when she was younger     Past Medical History:   Diagnosis Date    Acid reflux     Gallstones 11-12-15    Pneumonia 2014    2 episodes of pneumonia    Seasonal allergies      History reviewed. No pertinent surgical history.  Family History   Problem Relation Age of Onset    Asthma Mother     Diabetes Mother     Scoliosis Mother      Social History   Substance Use Topics    Smoking status: Current Every Day Smoker     Packs/day: 0.20     Types: Cigarettes    Smokeless tobacco: Never Used    Alcohol use No     Review of Systems   Constitutional: Negative for fever.   HENT: Positive for ear pain and hearing loss. Negative for ear discharge, rhinorrhea and sore throat.    Respiratory: Positive for cough.    Gastrointestinal: Negative for nausea and vomiting.   Musculoskeletal: Negative for neck pain.   Allergic/Immunologic: Negative for immunocompromised state.   Neurological: Negative for dizziness, facial asymmetry and speech difficulty.   Hematological: Negative for adenopathy.   Psychiatric/Behavioral: Negative for agitation and confusion.   All other systems reviewed and are negative.      Physical Exam      Initial Vitals [08/19/17 1243]   BP Pulse Resp Temp SpO2   135/75 95 15 99 °F (37.2 °C) 99 %      MAP       95         Physical Exam    Nursing note and vitals reviewed.  Constitutional: She appears well-developed and well-nourished. No distress.   HENT:   Head: Normocephalic and atraumatic.   Right Ear: Hearing, external ear and ear canal normal. There is tenderness. No mastoid tenderness. Tympanic membrane is injected. Tympanic membrane is not perforated. A middle ear effusion (dull) is present.   Left Ear: Hearing, tympanic membrane, external ear and ear canal normal.   Nose: Nose normal.   Mouth/Throat: Oropharynx is clear and moist.   Eyes: Conjunctivae and EOM are normal.   Neck: Normal range of motion. Neck supple. No tracheal deviation present.   Cardiovascular: Normal rate and regular rhythm.   Pulmonary/Chest: Breath sounds normal. No respiratory distress. She has no wheezes. She has no rhonchi. She has no rales.   Musculoskeletal: Normal range of motion. She exhibits no tenderness.   Lymphadenopathy:     She has no cervical adenopathy.   Neurological: She is alert and oriented to person, place, and time. She has normal strength.   Skin: Skin is warm and dry. No rash noted. No erythema.   Psychiatric: She has a normal mood and affect. Thought content normal.         ED Course   Procedures  Labs Reviewed - No data to display     Imaging Results          X-Ray Chest PA And Lateral (Final result)  Result time 08/19/17 13:35:22    Final result by Idalia Winston MD (08/19/17 13:35:22)                 Impression:     Slight better aeration compared to the prior exam continued right middle lobe volume loss and left lower lobe volume loss, associated airspace disease not excluded.  Followup aftertreatment completed recommended to insure proper aeration.      Electronically signed by: IDALIA WINSTON MD  Date:     08/19/17  Time:    13:35              Narrative:    PA and lateral chest  radiographs    Comparison: 8/7/17    Results: Small area of increased attenuation in the middle lobe and left lung base   suspicious for a small area of atelectasis, associated airspace disease not excluded, it is slightly better aerated compared to the prior exam.  The cardiac silhouette is normal in size.  The pulmonary vascularity is normal.  No pleural effusion.  No pneumothorax.  The osseous structures appear normal.                                 Medical Decision Making:   Initial Assessment:   Cough, R ear pain  Differential Diagnosis:   OM, OE, worsening pneumonia, bronchitis  Clinical Tests:   Radiological Study: Ordered and Reviewed  ED Management:  Patient presents to ED in NAD, afebrile, and nontoxic.  There is some preauricular tenderness noted to her right ear with dullness to her right TM mild injection.  Symptoms seem most consistent with an otitis media.  Chest x-ray shows improvement from her previous x-ray will when she was diagnosed with pneumonia.  Patient was given information on symptomatic control of her ear pain including use of OTC antihistamine with decongestant, Tylenol and Motrin.  She is instructed to follow up with PCP for further evaluation or coughing insurance of clearance of her pneumonia.  Strict return precautions given and patient verbalized understanding.    RX: azithromycin   Other:   I discussed test(s) with the performing physician.                   ED Course     Clinical Impression:   The primary encounter diagnosis was Right otitis media, unspecified chronicity, unspecified otitis media type. A diagnosis of Cough was also pertinent to this visit.                           Susan Lyles PA-C  08/19/17 6830

## 2017-08-19 NOTE — DISCHARGE INSTRUCTIONS
Take an antihistamine with a decongestant, such as zyrtec-D or claritin-D to aid in decreasing congestion.  Follow up with PCP for further evaluation.

## 2017-08-19 NOTE — ED NOTES
Pt c/o R ear pain x 3 days that has worsened and pt now has decreased hearing.  Pt states she recently was treated for pneumonia, finished antibiotics, but continues with couch.

## 2017-08-22 ENCOUNTER — OFFICE VISIT (OUTPATIENT)
Dept: OBSTETRICS AND GYNECOLOGY | Facility: CLINIC | Age: 27
End: 2017-08-22
Payer: MEDICAID

## 2017-08-22 VITALS
HEIGHT: 65 IN | DIASTOLIC BLOOD PRESSURE: 68 MMHG | SYSTOLIC BLOOD PRESSURE: 118 MMHG | BODY MASS INDEX: 38.31 KG/M2 | WEIGHT: 229.94 LBS

## 2017-08-22 DIAGNOSIS — Z12.4 CERVICAL CANCER SCREENING: Primary | ICD-10-CM

## 2017-08-22 PROCEDURE — 3008F BODY MASS INDEX DOCD: CPT | Mod: ,,, | Performed by: OBSTETRICS & GYNECOLOGY

## 2017-08-22 PROCEDURE — 99395 PREV VISIT EST AGE 18-39: CPT | Mod: S$PBB,,, | Performed by: OBSTETRICS & GYNECOLOGY

## 2017-08-22 PROCEDURE — 99212 OFFICE O/P EST SF 10 MIN: CPT | Mod: PBBFAC,PO | Performed by: OBSTETRICS & GYNECOLOGY

## 2017-08-22 PROCEDURE — 88175 CYTOPATH C/V AUTO FLUID REDO: CPT

## 2017-08-22 PROCEDURE — 99999 PR PBB SHADOW E&M-EST. PATIENT-LVL II: CPT | Mod: PBBFAC,,, | Performed by: OBSTETRICS & GYNECOLOGY

## 2017-08-22 RX ORDER — PANTOPRAZOLE SODIUM 20 MG/1
TABLET, DELAYED RELEASE ORAL
COMMUNITY
Start: 2017-08-21

## 2017-08-22 RX ORDER — LORATADINE 10 MG/1
TABLET ORAL
COMMUNITY
Start: 2017-08-21

## 2017-08-28 NOTE — PROGRESS NOTES
"GYNECOLOGY  :  Therese De Leon is a 27 y.o.    Here today for  Annual visit and IUD check   No major complaints       Past Medical History:   Diagnosis Date    Acid reflux     Gallstones 11-12-15    Pneumonia     2 episodes of pneumonia    Seasonal allergies      History reviewed. No pertinent surgical history.  Family History   Problem Relation Age of Onset    Asthma Mother     Diabetes Mother     Scoliosis Mother      Social History   Substance Use Topics    Smoking status: Current Every Day Smoker     Packs/day: 0.20     Types: Cigarettes    Smokeless tobacco: Never Used    Alcohol use No     OB History    Para Term  AB Living   3 2 2 0 1 2   SAB TAB Ectopic Multiple Live Births   1 0 0 0 2      # Outcome Date GA Lbr Laith/2nd Weight Sex Delivery Anes PTL Lv   3 Term 16 39w0d 12:00 / 00:44 3.645 kg (8 lb 0.6 oz) F Vag-Spont EPI N BHAKTI   2 Term 04/21/15 39w1d  3.774 kg (8 lb 5.1 oz) M Vag-Spont Spinal N BHAKTI      Birth Comments: Received hep b vaccine in hospital   1 SAB 13                  /68   Ht 5' 5" (1.651 m)   Wt 104.3 kg (229 lb 15 oz)   BMI 38.26 kg/m²     Last PAP=   LMP = no on IUD   Last Mammogram = n/a  Last Colonoscopy  =n/a      COMPREHENSIVE GYN HISTORY:       PAP History: Denies abnormal Paps.  Infection History: Denies STDs. Denies PID.  Benign History: Denies uterine fibroids. Denies ovarian cysts. Denies endometriosis.   Cancer History: Denies cervical cancer. Denies uterine cancer or hyperplasia. Denies ovarian cancer. Denies vulvar cancer or pre-cancer. Denies vaginal cancer or pre-cancer. Denies breast cancer. Denies colon cancer.  Sexual Activity History: ( x ) Yes   (  )   no   Menstrual History: Age of menarche: ( 14  )  years. Every (  30 )       days, flows for ( 4  )   days. moderate  flow.  Dysmenorrhea History:  absent  Contraception:  iudMirena     ROS  GENERAL: Denies significant weight gain or weight loss. Feeling well " overall.  SKIN: Denies rash or lesions.  Normal skin turgor  HEAD: Denies head injury or headache.   NODES: Denies enlarged lymph nodes.   CHEST: Denies chest pain or shortness of breath.   CARDIOVASCULAR: Denies palpitations or left sided chest pain.   ABDOMEN: No abdominal pain,no  diarrhea,constipation  nausea, vomiting or rectal bleeding.   URINARY: normal  Frequency,no  Dysuria or burning on urination.   REPRODUCTIVE: Per HPI   BREASTS: The patient sometimes performs breast self-examination and denies pain, lumps, or nipple discharge.   HEMATOLOGIC: No easy bruisability or excessive bleeding.   MUSCULOSKELETAL: Denies joint pain or swelling.   NEUROLOGIC: Denies syncope or weakness.   PSYCHIATRIC: Denies depression, anxiety or mood swings.    Physical Exam     Constitutional: She is oriented to person, place, and time. She appears well-developed and well-nourished. No distress.   HENT:   Head: Normocephalic.   NECK: Neck symmetric without masses or thyromegaly.  Cardiovascular: Normal rate and regular rhythm.   Pulmonary/Chest: Effort normal and breath sounds normal. No respiratory distress. She has no wheezes.   Breasts: Symmetrical, no skin changes or visible lesions. No palpable masses, nipple discharge or adenopathy bilaterally.  Abdominal: Soft not distended. Bowel sounds are normal. She exhibits   no masses . No tenderness to palpation.   Genitourinary: Pelvic exam was performed with patient supine.   External genitalia normal no lesions.Normal hair distribution   Adequate perineal body,normal urethral meatus   Vagina moist and well rugated without lesions, no vaginal  Discharge.   Cervix pink and without lesions. No bleeding. No significant cystocele or rectocele. IUD strings seen  Bimanual exam showed uterus normal size, shape and position , mobile and nontender. Adnexa without masses or tenderness. Urethra and bladder normal  Extremities normal no cyanosis ,edema.     Procedures:  Pap smear      A/P  Therese De Leon 27 y.o.     Dx=  1- routine gyn visit   2-  3-    Plan:  Routine gyn   -s/p normal breast exam   -s/p normal pelvic exam:    Patient was counseled today on A.C.S. Pap guidelines and recommendations for yearly pelvic exams, mammograms and monthly self breast exams; to see her PCP for other health maintenance, nutrition and weight gain counseling, discussed lab values.  Discussed colonoscopy recommendations every 10 years starting at age 50   Calcium and vit D daily intake     F/u in 1 yr or ТАТЬЯНА Alcantar M.D.   OB/GYN

## 2018-07-10 ENCOUNTER — TELEPHONE (OUTPATIENT)
Dept: OBSTETRICS AND GYNECOLOGY | Facility: CLINIC | Age: 28
End: 2018-07-10

## 2018-09-14 ENCOUNTER — OFFICE VISIT (OUTPATIENT)
Dept: OBSTETRICS AND GYNECOLOGY | Facility: CLINIC | Age: 28
End: 2018-09-14
Payer: MEDICAID

## 2018-09-14 VITALS
BODY MASS INDEX: 39.62 KG/M2 | HEIGHT: 65 IN | DIASTOLIC BLOOD PRESSURE: 76 MMHG | SYSTOLIC BLOOD PRESSURE: 116 MMHG | WEIGHT: 237.81 LBS

## 2018-09-14 DIAGNOSIS — Z97.5 IUD (INTRAUTERINE DEVICE) IN PLACE: ICD-10-CM

## 2018-09-14 DIAGNOSIS — Z01.419 ENCOUNTER FOR GYNECOLOGICAL EXAMINATION WITHOUT ABNORMAL FINDING: Primary | ICD-10-CM

## 2018-09-14 PROCEDURE — 99395 PREV VISIT EST AGE 18-39: CPT | Mod: S$PBB,,, | Performed by: OBSTETRICS & GYNECOLOGY

## 2018-09-14 PROCEDURE — 99213 OFFICE O/P EST LOW 20 MIN: CPT | Mod: PBBFAC,PO | Performed by: OBSTETRICS & GYNECOLOGY

## 2018-09-14 PROCEDURE — 99999 PR PBB SHADOW E&M-EST. PATIENT-LVL III: CPT | Mod: PBBFAC,,, | Performed by: OBSTETRICS & GYNECOLOGY

## 2018-09-14 NOTE — PROGRESS NOTES
"GYNECOLOGY  :  Therese De Leon is a 28 y.o.    Here today for  Annual visit and IUD check   No gyn  complaints       Past Medical History:   Diagnosis Date    Acid reflux     Gallstones 11-12-15    Pneumonia     2 episodes of pneumonia    Seasonal allergies      Past Surgical History:   Procedure Laterality Date    CHOLECYSTECTOMY-LAPAROSCOPIC N/A 2017    Performed by Carmelo Celestin MD at [facility] OR     Family History   Problem Relation Age of Onset    Asthma Mother     Diabetes Mother     Scoliosis Mother      Social History     Tobacco Use    Smoking status: Current Every Day Smoker     Packs/day: 0.20     Types: Cigarettes    Smokeless tobacco: Never Used   Substance Use Topics    Alcohol use: No    Drug use: No     OB History    Para Term  AB Living   3 2 2 0 1 2   SAB TAB Ectopic Multiple Live Births   1 0 0 0 2      # Outcome Date GA Lbr Laith/2nd Weight Sex Delivery Anes PTL Lv   3 Term 16 39w0d 12:00 / 00:44 3.645 kg (8 lb 0.6 oz) F Vag-Spont EPI N BHAKTI   2 Term 04/21/15 39w1d  3.774 kg (8 lb 5.1 oz) M Vag-Spont Spinal N BHAKTI      Birth Comments: Received hep b vaccine in hospital   1 SAB 13                  /76   Ht 5' 5" (1.651 m)   Wt 107.9 kg (237 lb 13 oz)   BMI 39.57 kg/m²     Last PAP=   LMP = not on IUD   Last Mammogram = n/a  Last Colonoscopy  =n/a      COMPREHENSIVE GYN HISTORY:       PAP History: Denies abnormal Paps.  Infection History: Denies STDs. Denies PID.  Benign History: Denies uterine fibroids. Denies ovarian cysts. Denies endometriosis.   Cancer History: Denies cervical cancer. Denies uterine cancer or hyperplasia. Denies ovarian cancer. Denies vulvar cancer or pre-cancer. Denies vaginal cancer or pre-cancer. Denies breast cancer. Denies colon cancer.  Sexual Activity History: ( x ) Yes   (  )   no   Menstrual History: Age of menarche: ( 14  )  years.   Dysmenorrhea History:  absent  Contraception:  iudMirena "     ROS  GENERAL: Denies significant weight gain or weight loss. Feeling well overall.  SKIN: Denies rash or lesions.  Normal skin turgor  HEAD: Denies head injury or headache.   NODES: Denies enlarged lymph nodes.   CHEST: Denies chest pain or shortness of breath.   CARDIOVASCULAR: Denies palpitations or left sided chest pain.   ABDOMEN: No abdominal pain,no  diarrhea,constipation  nausea, vomiting or rectal bleeding.   URINARY: normal  Frequency,no  Dysuria or burning on urination.   REPRODUCTIVE: Per HPI   BREASTS: The patient sometimes performs breast self-examination and denies pain, lumps, or nipple discharge.   HEMATOLOGIC: No easy bruisability or excessive bleeding.   MUSCULOSKELETAL: Denies joint pain or swelling.   NEUROLOGIC: Denies syncope or weakness.   PSYCHIATRIC: Denies depression, anxiety or mood swings.    Physical Exam     Constitutional: She is oriented to person, place, and time. She appears well-developed and well-nourished. No distress.   HENT:   Head: Normocephalic.   NECK: Neck symmetric without masses or thyromegaly.  Cardiovascular: Normal rate and regular rhythm.   Pulmonary/Chest: Effort normal and breath sounds normal. No respiratory distress. She has no wheezes.   Breasts: Symmetrical, no skin changes or visible lesions. No palpable masses, nipple discharge or adenopathy bilaterally.  Abdominal: Soft not distended. Bowel sounds are normal. She exhibits   no masses . No tenderness to palpation.   Genitourinary: Pelvic exam was performed with patient supine.   External genitalia normal no lesions.Normal hair distribution   Adequate perineal body,normal urethral meatus   Vagina moist and well rugated without lesions, no vaginal  Discharge.   Cervix pink and without lesions. No bleeding. No significant cystocele or rectocele. IUD strings seen  Bimanual exam showed uterus normal size, shape and position , mobile and nontender. Adnexa without masses or tenderness. Urethra and bladder  normal  Extremities normal no cyanosis ,edema.     Procedures:  Pap smear      A/P Therese De Leon 28 y.o.     Dx=  1- routine gyn visit   2- IUD  in place   3-    Plan:  Routine gyn   -s/p normal breast exam   -s/p normal pelvic exam:    Patient was counseled today on A.C.S. Pap guidelines and recommendations for yearly pelvic exams, mammograms and monthly self breast exams; to see her PCP for other health maintenance, nutrition and weight gain counseling, discussed lab values.  Discussed colonoscopy recommendations every 10 years starting at age 50   Calcium and vit D daily intake     F/u in 1 yr or PRN    Trino Alcantar M.D.   OB/GYN

## 2018-10-21 ENCOUNTER — OFFICE VISIT (OUTPATIENT)
Dept: URGENT CARE | Facility: CLINIC | Age: 28
End: 2018-10-21
Payer: MEDICAID

## 2018-10-21 VITALS
SYSTOLIC BLOOD PRESSURE: 126 MMHG | HEIGHT: 65 IN | RESPIRATION RATE: 18 BRPM | OXYGEN SATURATION: 97 % | HEART RATE: 102 BPM | WEIGHT: 230 LBS | TEMPERATURE: 99 F | DIASTOLIC BLOOD PRESSURE: 87 MMHG | BODY MASS INDEX: 38.32 KG/M2

## 2018-10-21 DIAGNOSIS — J06.9 VIRAL URI WITH COUGH: Primary | ICD-10-CM

## 2018-10-21 PROCEDURE — 99203 OFFICE O/P NEW LOW 30 MIN: CPT | Mod: S$GLB,,, | Performed by: PHYSICIAN ASSISTANT

## 2018-10-21 RX ORDER — DEXAMETHASONE SODIUM PHOSPHATE 100 MG/10ML
6 INJECTION INTRAMUSCULAR; INTRAVENOUS ONCE
Status: COMPLETED | OUTPATIENT
Start: 2018-10-21 | End: 2018-10-21

## 2018-10-21 RX ORDER — AZITHROMYCIN 250 MG/1
TABLET, FILM COATED ORAL
Qty: 6 TABLET | Refills: 0 | Status: SHIPPED | OUTPATIENT
Start: 2018-10-21 | End: 2019-05-01

## 2018-10-21 RX ORDER — BENZONATATE 100 MG/1
200 CAPSULE ORAL 3 TIMES DAILY PRN
Qty: 60 CAPSULE | Refills: 0 | Status: SHIPPED | OUTPATIENT
Start: 2018-10-21 | End: 2019-05-01

## 2018-10-21 RX ADMIN — DEXAMETHASONE SODIUM PHOSPHATE 6 MG: 100 INJECTION INTRAMUSCULAR; INTRAVENOUS at 03:10

## 2018-10-21 NOTE — PATIENT INSTRUCTIONS
Viral Upper Respiratory Illness (Adult)  You have a viral upper respiratory illness (URI), which is another term for the common cold. This illness is contagious during the first few days. It is spread through the air by coughing and sneezing. It may also be spread by direct contact (touching the sick person and then touching your own eyes, nose, or mouth). Frequent handwashing will decrease risk of spread. Most viral illnesses go away within 7 to 10 days with rest and simple home remedies. Sometimes the illness may last for several weeks. Antibiotics will not kill a virus, and they are generally not prescribed for this condition.    Home care  · If symptoms are severe, rest at home for the first 2 to 3 days. When you resume activity, don't let yourself get too tired.  · Avoid being exposed to cigarette smoke (yours or others).  · You may use acetaminophen or ibuprofen to control pain and fever, unless another medicine was prescribed. (Note: If you have chronic liver or kidney disease, have ever had a stomach ulcer or gastrointestinal bleeding, or are taking blood-thinning medicines, talk with your healthcare provider before using these medicines.) Aspirin should never be given to anyone under 18 years of age who is ill with a viral infection or fever. It may cause severe liver or brain damage.  · Your appetite may be poor, so a light diet is fine. Avoid dehydration by drinking 6 to 8 glasses of fluids per day (water, soft drinks, juices, tea, or soup). Extra fluids will help loosen secretions in the nose and lungs.  · Over-the-counter cold medicines will not shorten the length of time youre sick, but they may be helpful for the following symptoms: cough, sore throat, and nasal and sinus congestion. (Note: Do not use decongestants if you have high blood pressure.)  Follow-up care  Follow up with your healthcare provider, or as advised.  When to seek medical advice  Call your healthcare provider right away if any  of these occur:  · Cough with lots of colored sputum (mucus)  · Severe headache; face, neck, or ear pain  · Difficulty swallowing due to throat pain  · Fever of 100.4°F (38°C)  Call 911, or get immediate medical care  Call emergency services right away if any of these occur:  · Chest pain, shortness of breath, wheezing, or difficulty breathing  · Coughing up blood  · Inability to swallow due to throat pain  Date Last Reviewed: 9/13/2015  © 2020-8962 [employer]. [address] [place] [id number]. All rights reserved. This information is not intended as a substitute for professional medical care. Always follow your healthcare professional's instructions.      Please follow up with your Primary care provider within 2-5 days if your signs and symptoms have not resolved or worsen.     If your condition worsens or fails to improve we recommend that you receive another evaluation at the emergency room immediately or contact your primary medical clinic to discuss your concerns.   You must understand that you have received an Urgent Care treatment only and that you may be released before all of your medical problems are known or treated. You, the patient, will arrange for follow up care as instructed.     RED FLAGS/WARNING SYMPTOMS DISCUSSED WITH PATIENT THAT WOULD WARRANT EMERGENT MEDICAL ATTENTION. PATIENT VERBALIZED UNDERSTANDING.     YOU HAVE BEEN GIVEN A PRESCRIPTION FOR ANTIBIOTICS. IT WAS ADVISED TO DELAY THE COURSE OF ANTIBIOTICS FOR 2-3 DAYS IF SYMPTOMS DO NOT RESOLVE. IT IMPORTANT TO FOLLOW THESE INSTRUCTIONS AS ANTIBIOTIC RESISTANCE IS HIGH. YOUR SYMPTOMS WILL LIKELY RESOLVE WITHOUT THIS PRESCRIPTIONS.

## 2018-10-21 NOTE — PROGRESS NOTES
"Subjective:       Patient ID: Therese De Leon is a 28 y.o. female.    Vitals:  height is 5' 5" (1.651 m) and weight is 104.3 kg (230 lb). Her oral temperature is 99.2 °F (37.3 °C). Her blood pressure is 126/87 and her pulse is 102. Her respiration is 18 and oxygen saturation is 97%.     Chief Complaint: Cough (2 days)    Cough   This is a new problem. The problem has been unchanged. The problem occurs constantly. The cough is productive of sputum. Associated symptoms include ear pain, headaches, nasal congestion and postnasal drip. Pertinent negatives include no chest pain, chills, eye redness, fever, myalgias, sore throat, shortness of breath or wheezing. The symptoms are aggravated by lying down. Her past medical history is significant for environmental allergies and pneumonia. There is no history of asthma, bronchitis, COPD or emphysema.     Review of Systems   Constitution: Positive for malaise/fatigue. Negative for chills and fever.   HENT: Positive for congestion, ear pain and postnasal drip. Negative for hoarse voice and sore throat.    Eyes: Negative for discharge and redness.   Cardiovascular: Negative for chest pain, dyspnea on exertion and leg swelling.   Respiratory: Positive for cough and sputum production. Negative for shortness of breath and wheezing.    Musculoskeletal: Negative for myalgias.   Gastrointestinal: Negative for abdominal pain and nausea.   Neurological: Positive for headaches.   Allergic/Immunologic: Positive for environmental allergies.       Objective:      Physical Exam   Constitutional: She is oriented to person, place, and time. She appears well-developed and well-nourished. She is cooperative.  Non-toxic appearance. She does not appear ill. No distress.   HENT:   Head: Normocephalic and atraumatic.   Right Ear: Hearing, tympanic membrane, external ear and ear canal normal.   Left Ear: Hearing, tympanic membrane, external ear and ear canal normal.   Nose: Rhinorrhea present. No " mucosal edema or nasal deformity. No epistaxis. Right sinus exhibits no maxillary sinus tenderness and no frontal sinus tenderness. Left sinus exhibits no maxillary sinus tenderness and no frontal sinus tenderness.   Mouth/Throat: Uvula is midline and mucous membranes are normal. No trismus in the jaw. Normal dentition. No uvula swelling. Posterior oropharyngeal erythema present. Tonsils are 1+ on the right. Tonsils are 1+ on the left. No tonsillar exudate.   Eyes: Conjunctivae and lids are normal. Right eye exhibits no discharge. Left eye exhibits no discharge. No scleral icterus.   Sclera clear bilat   Neck: Trachea normal, normal range of motion, full passive range of motion without pain and phonation normal. Neck supple.   Cardiovascular: Normal rate, regular rhythm, normal heart sounds, intact distal pulses and normal pulses.   Pulmonary/Chest: Effort normal. No accessory muscle usage or stridor. No respiratory distress. She has no decreased breath sounds. She has no wheezes. She has no rhonchi. She has no rales.   Frequent nonproductive cough     Abdominal: Soft. Normal appearance and bowel sounds are normal. She exhibits no distension, no pulsatile midline mass and no mass. There is no tenderness.   Musculoskeletal: Normal range of motion. She exhibits no edema or deformity.   Neurological: She is alert and oriented to person, place, and time. She exhibits normal muscle tone. Coordination normal.   Skin: Skin is warm, dry and intact. She is not diaphoretic. No pallor.   Psychiatric: She has a normal mood and affect. Her speech is normal and behavior is normal. Judgment and thought content normal. Cognition and memory are normal.   Nursing note and vitals reviewed.      Assessment:       1. Viral URI with cough        Plan:         Viral URI with cough  -     dexamethasone injection 6 mg  -     benzonatate (TESSALON PERLES) 100 MG capsule; Take 2 capsules (200 mg total) by mouth 3 (three) times daily as needed  for Cough.  Dispense: 60 capsule; Refill: 0  -     azithromycin (ZITHROMAX Z-BRANDYN) 250 MG tablet; Take 2 tablets (500 mg) on  Day 1,  followed by 1 tablet (250 mg) once daily on Days 2 through 5.  Dispense: 6 tablet; Refill: 0          Viral Upper Respiratory Illness (Adult)  You have a viral upper respiratory illness (URI), which is another term for the common cold. This illness is contagious during the first few days. It is spread through the air by coughing and sneezing. It may also be spread by direct contact (touching the sick person and then touching your own eyes, nose, or mouth). Frequent handwashing will decrease risk of spread. Most viral illnesses go away within 7 to 10 days with rest and simple home remedies. Sometimes the illness may last for several weeks. Antibiotics will not kill a virus, and they are generally not prescribed for this condition.    Home care  · If symptoms are severe, rest at home for the first 2 to 3 days. When you resume activity, don't let yourself get too tired.  · Avoid being exposed to cigarette smoke (yours or others).  · You may use acetaminophen or ibuprofen to control pain and fever, unless another medicine was prescribed. (Note: If you have chronic liver or kidney disease, have ever had a stomach ulcer or gastrointestinal bleeding, or are taking blood-thinning medicines, talk with your healthcare provider before using these medicines.) Aspirin should never be given to anyone under 18 years of age who is ill with a viral infection or fever. It may cause severe liver or brain damage.  · Your appetite may be poor, so a light diet is fine. Avoid dehydration by drinking 6 to 8 glasses of fluids per day (water, soft drinks, juices, tea, or soup). Extra fluids will help loosen secretions in the nose and lungs.  · Over-the-counter cold medicines will not shorten the length of time youre sick, but they may be helpful for the following symptoms: cough, sore throat, and nasal and  sinus congestion. (Note: Do not use decongestants if you have high blood pressure.)  Follow-up care  Follow up with your healthcare provider, or as advised.  When to seek medical advice  Call your healthcare provider right away if any of these occur:  · Cough with lots of colored sputum (mucus)  · Severe headache; face, neck, or ear pain  · Difficulty swallowing due to throat pain  · Fever of 100.4°F (38°C)  Call 911, or get immediate medical care  Call emergency services right away if any of these occur:  · Chest pain, shortness of breath, wheezing, or difficulty breathing  · Coughing up blood  · Inability to swallow due to throat pain  Date Last Reviewed: 9/13/2015  © 5938-8546 [employer]. [address] [place] [address], [place] [address]. All rights reserved. This information is not intended as a substitute for professional medical care. Always follow your healthcare professional's instructions.      Please follow up with your Primary care provider within 2-5 days if your signs and symptoms have not resolved or worsen.     If your condition worsens or fails to improve we recommend that you receive another evaluation at the emergency room immediately or contact your primary medical clinic to discuss your concerns.   You must understand that you have received an Urgent Care treatment only and that you may be released before all of your medical problems are known or treated. You, the patient, will arrange for follow up care as instructed.     RED FLAGS/WARNING SYMPTOMS DISCUSSED WITH PATIENT THAT WOULD WARRANT EMERGENT MEDICAL ATTENTION. PATIENT VERBALIZED UNDERSTANDING.       YOU HAVE BEEN GIVEN A PRESCRIPTION FOR ANTIBIOTICS. IT WAS ADVISED TO DELAY THE COURSE OF ANTIBIOTICS FOR 2-3 DAYS IF SYMPTOMS DO NOT RESOLVE. IT IMPORTANT TO FOLLOW THESE INSTRUCTIONS AS ANTIBIOTIC RESISTANCE IS HIGH. YOUR SYMPTOMS WILL LIKELY RESOLVE WITHOUT THIS PRESCRIPTIONS.

## 2018-11-17 ENCOUNTER — HOSPITAL ENCOUNTER (EMERGENCY)
Facility: HOSPITAL | Age: 28
Discharge: HOME | End: 2018-11-18
Attending: EMERGENCY MEDICINE
Payer: MEDICAID

## 2018-11-17 DIAGNOSIS — M62.838 MUSCLE SPASM: Primary | ICD-10-CM

## 2018-11-17 LAB
ANION GAP SERPL CALC-SCNC: 10 MMOL/L
B-HCG UR QL: NEGATIVE
BASOPHILS # BLD AUTO: 0.05 K/UL
BASOPHILS NFR BLD: 0.5 %
BUN SERPL-MCNC: 13 MG/DL
CALCIUM SERPL-MCNC: 9.8 MG/DL
CHLORIDE SERPL-SCNC: 104 MMOL/L
CO2 SERPL-SCNC: 24 MMOL/L
CREAT SERPL-MCNC: 0.7 MG/DL
CTP QC/QA: YES
DIFFERENTIAL METHOD: ABNORMAL
EOSINOPHIL # BLD AUTO: 0.7 K/UL
EOSINOPHIL NFR BLD: 7 %
ERYTHROCYTE [DISTWIDTH] IN BLOOD BY AUTOMATED COUNT: 14 %
EST. GFR  (AFRICAN AMERICAN): >60 ML/MIN/1.73 M^2
EST. GFR  (NON AFRICAN AMERICAN): >60 ML/MIN/1.73 M^2
GLUCOSE SERPL-MCNC: 96 MG/DL
HCT VFR BLD AUTO: 42 %
HGB BLD-MCNC: 13.2 G/DL
LYMPHOCYTES # BLD AUTO: 3.1 K/UL
LYMPHOCYTES NFR BLD: 33 %
MAGNESIUM SERPL-MCNC: 2.3 MG/DL
MCH RBC QN AUTO: 26.8 PG
MCHC RBC AUTO-ENTMCNC: 31.4 G/DL
MCV RBC AUTO: 85 FL
MONOCYTES # BLD AUTO: 0.4 K/UL
MONOCYTES NFR BLD: 4.2 %
NEUTROPHILS # BLD AUTO: 5.1 K/UL
NEUTROPHILS NFR BLD: 55 %
PLATELET # BLD AUTO: 210 K/UL
PMV BLD AUTO: 10.4 FL
POTASSIUM SERPL-SCNC: 3.9 MMOL/L
RBC # BLD AUTO: 4.93 M/UL
SODIUM SERPL-SCNC: 138 MMOL/L
WBC # BLD AUTO: 9.32 K/UL

## 2018-11-17 PROCEDURE — 96372 THER/PROPH/DIAG INJ SC/IM: CPT | Mod: 59

## 2018-11-17 PROCEDURE — 85025 COMPLETE CBC W/AUTO DIFF WBC: CPT

## 2018-11-17 PROCEDURE — 80048 BASIC METABOLIC PNL TOTAL CA: CPT

## 2018-11-17 PROCEDURE — 83735 ASSAY OF MAGNESIUM: CPT

## 2018-11-17 PROCEDURE — 81025 URINE PREGNANCY TEST: CPT | Performed by: NURSE PRACTITIONER

## 2018-11-17 PROCEDURE — 96374 THER/PROPH/DIAG INJ IV PUSH: CPT

## 2018-11-17 PROCEDURE — 99284 EMERGENCY DEPT VISIT MOD MDM: CPT | Mod: 25

## 2018-11-17 PROCEDURE — 63600175 PHARM REV CODE 636 W HCPCS: Performed by: NURSE PRACTITIONER

## 2018-11-17 RX ORDER — ORPHENADRINE CITRATE 30 MG/ML
30 INJECTION INTRAMUSCULAR; INTRAVENOUS
Status: COMPLETED | OUTPATIENT
Start: 2018-11-17 | End: 2018-11-17

## 2018-11-17 RX ORDER — PROCHLORPERAZINE EDISYLATE 5 MG/ML
10 INJECTION INTRAMUSCULAR; INTRAVENOUS ONCE
Status: COMPLETED | OUTPATIENT
Start: 2018-11-18 | End: 2018-11-17

## 2018-11-17 RX ORDER — PROCHLORPERAZINE EDISYLATE 5 MG/ML
10 INJECTION INTRAMUSCULAR; INTRAVENOUS ONCE
Status: DISCONTINUED | OUTPATIENT
Start: 2018-11-18 | End: 2018-11-17

## 2018-11-17 RX ADMIN — PROCHLORPERAZINE EDISYLATE 10 MG: 5 INJECTION INTRAMUSCULAR; INTRAVENOUS at 11:11

## 2018-11-17 RX ADMIN — ORPHENADRINE CITRATE 30 MG: 30 INJECTION INTRAMUSCULAR; INTRAVENOUS at 11:11

## 2018-11-18 VITALS
HEART RATE: 80 BPM | OXYGEN SATURATION: 100 % | DIASTOLIC BLOOD PRESSURE: 65 MMHG | SYSTOLIC BLOOD PRESSURE: 120 MMHG | HEIGHT: 65 IN | BODY MASS INDEX: 38.32 KG/M2 | TEMPERATURE: 99 F | WEIGHT: 230 LBS | RESPIRATION RATE: 15 BRPM

## 2018-11-18 PROCEDURE — 63600175 PHARM REV CODE 636 W HCPCS: Performed by: NURSE PRACTITIONER

## 2018-11-18 RX ORDER — ORPHENADRINE CITRATE 100 MG/1
100 TABLET, EXTENDED RELEASE ORAL 2 TIMES DAILY
Qty: 10 TABLET | Refills: 0 | Status: SHIPPED | OUTPATIENT
Start: 2018-11-18 | End: 2018-11-23

## 2018-11-18 NOTE — ED PROVIDER NOTES
"Encounter Date: 11/17/2018       History     Chief Complaint   Patient presents with    Spasms     C/O SPASMS TO LEFT NECK AND LOWER FACE ONSET 1 HOUR PTA. DENIES TAKING NEW MEDICATIONS.     Patient is a 28-year-old female with past medical history of acid reflux, gallstones, pneumonia, and seasonal allergies who presents to ED for evaluation of muscle spasm to left side of neck x1 hour.  Denies injury or trauma.  Patient also describes her bottom lip as "feeling tight."  Patient describes the spasm as "intermittent and tight."  Denies numbness or tingling.  Denies taking any new medications.  Denies any alleviating or exacerbating factors.  Denies fever, chills, neck stiffness, SOB, chest pain, nausea, vomiting, diarrhea, and abdominal pain. Denies any other complaints at this time.      The history is provided by the patient.     Review of patient's allergies indicates:   Allergen Reactions    Bactrim [sulfamethoxazole-trimethoprim]      Pt was told when she was younger    Pcn [penicillins]      Pt was told when she was younger     Past Medical History:   Diagnosis Date    Acid reflux     Gallstones 11-12-15    Pneumonia 2014    2 episodes of pneumonia    Seasonal allergies      Past Surgical History:   Procedure Laterality Date    CHOLECYSTECTOMY      CHOLECYSTECTOMY-LAPAROSCOPIC N/A 4/25/2017    Performed by Carmelo Celestin MD at [facility] OR     Family History   Problem Relation Age of Onset    Asthma Mother     Diabetes Mother     Scoliosis Mother      Social History     Tobacco Use    Smoking status: Current Every Day Smoker     Packs/day: 0.20     Years: 10.00     Pack years: 2.00     Types: Cigarettes    Smokeless tobacco: Never Used   Substance Use Topics    Alcohol use: No    Drug use: No     Review of Systems   Constitutional: Negative for fever.   Respiratory: Negative for shortness of breath.    Cardiovascular: Negative for chest pain.   Gastrointestinal: Negative for abdominal pain, " diarrhea, nausea and vomiting.   Musculoskeletal: Positive for myalgias (left side of neck muscle). Negative for back pain, gait problem and neck stiffness.   Skin: Negative for rash.   Neurological: Positive for facial asymmetry. Negative for dizziness, syncope, speech difficulty, weakness, light-headedness, numbness and headaches.   All other systems reviewed and are negative.      Physical Exam     Initial Vitals [11/17/18 2220]   BP Pulse Resp Temp SpO2   (!) 147/68 92 16 98.9 °F (37.2 °C) 100 %      MAP       --         Physical Exam    Vitals reviewed.  Constitutional: She appears well-developed and well-nourished. She is not diaphoretic.  Non-toxic appearance. She does not have a sickly appearance.   HENT:   Head: Atraumatic.   Dry MM.     Eyes: EOM are normal.   Neck: Trachea normal, full passive range of motion without pain and phonation normal. Neck supple. Muscular tenderness present. No spinous process tenderness present. Normal range of motion present. No neck rigidity.   Cardiovascular: Regular rhythm.   Asymptomatic hypertension.   Pulmonary/Chest: No respiratory distress.   Neurological: She is alert and oriented to person, place, and time. She has normal strength. No cranial nerve deficit or sensory deficit. Gait normal.   Clear, non-labored sentences. Normal facial symmetry. Normal finger-to-nose. Steady gait.    Skin: Skin is warm and intact. No rash noted.   Psychiatric: She has a normal mood and affect.         ED Course   Procedures  Labs Reviewed   CBC W/ AUTO DIFFERENTIAL - Abnormal; Notable for the following components:       Result Value    MCH 26.8 (*)     MCHC 31.4 (*)     Eos # 0.7 (*)     All other components within normal limits   BASIC METABOLIC PANEL   MAGNESIUM   POCT URINE PREGNANCY          Imaging Results    None          Medical Decision Making:   History:   Old Medical Records: I decided to obtain old medical records.  Initial Assessment:   Patient presents to ED for evaluation  muscle spasm to left side of neck x1 hour.  Appears well, nontoxic.  Afebrile.  Clear, nonlabored sentences.  Normal facial symmetry.  Normal finger-to-nose. Dry MM.   Differential Diagnosis:   Strain, spasm, electrolyte abnormality, dehydration  Clinical Tests:   Lab Tests: Ordered and Reviewed  ED Management:  POCT pregnancy, labs, compazine, norflex   UPT negative.  Labs unremarkable.  Low suspicion for CVA, no focal neuro deficits and patient is not on blood thinners.  Patient's signs and symptoms most likely due to muscle spasm from dehydration.  Patient is hemodynamically stable and will be DC home with prescription for Norflex.  Instructed not to drive, drink alcohol, or operate machinery while taking Norflex.  Instructed to follow up with daughters of [facility] on Monday and return to ED for any concerns or worsening symptoms.  Patient verbalizes DC instructions and is in compliance and agreement with treatment plan.              Attending Attestation:     Physician Attestation Statement for NP/PA:   I have conducted a face to face encounter with this patient in addition to the NP/PA, due to NP/PA Request    Other NP/PA Attestation Additions:    History of Present Illness: Agree; 20-year-old female presents emergency department for 1 or 2 hr of muscle spasms.  Reports they started at the left side of her neck and now involve midline neck as well. She reports it feels like her lipids being pulled down.  The symptoms are constant albeit fluctuating without exacerbating alleviating factors.  No other symptoms reported.   Physical Exam: Agree   Medical Decision Making: Agree; labs benign, no immediate explanation for the spasms are apparent.  Patient was treated with Norflex and Compazine and has had resolution of symptoms.  Instructed to follow up with primary care physician, on reasons to return to the emergency room                    Clinical Impression:   The encounter diagnosis was Muscle spasm.                              Olaf Pozo NP  11/18/18 0033       Nicanor Bernard MD  11/18/18 1839

## 2018-11-18 NOTE — ED NOTES
"Pt from home w/ c/o "muscle spasm" that started in left neck 1 hour before arrival. Patient also described her bottom lip feeling "tight." Patient reports the spasm is intermittent. Pt denies numbness or tingling to shyla arms and legs. Ambulated around in room w/o assistance, gait is steady.  "

## 2018-11-18 NOTE — DISCHARGE INSTRUCTIONS
Take prescribed medication as labeled as needed for muscle spasms.  Do not drive, drink alcohol, or operate machinery while taking medication.  Follow up with daughters of [facility] on Monday and return to ED for any concerns or worsening symptoms.

## 2019-03-28 ENCOUNTER — OFFICE VISIT (OUTPATIENT)
Dept: URGENT CARE | Facility: CLINIC | Age: 29
End: 2019-03-28
Payer: MEDICAID

## 2019-03-28 VITALS
WEIGHT: 250 LBS | BODY MASS INDEX: 41.65 KG/M2 | HEIGHT: 65 IN | RESPIRATION RATE: 18 BRPM | SYSTOLIC BLOOD PRESSURE: 128 MMHG | OXYGEN SATURATION: 98 % | TEMPERATURE: 98 F | HEART RATE: 80 BPM | DIASTOLIC BLOOD PRESSURE: 65 MMHG

## 2019-03-28 DIAGNOSIS — G89.29 CHRONIC PAIN OF RIGHT ANKLE: Primary | ICD-10-CM

## 2019-03-28 DIAGNOSIS — H65.191 ACUTE EFFUSION OF RIGHT EAR: ICD-10-CM

## 2019-03-28 DIAGNOSIS — H92.01 RIGHT EAR PAIN: ICD-10-CM

## 2019-03-28 DIAGNOSIS — M25.571 CHRONIC PAIN OF RIGHT ANKLE: Primary | ICD-10-CM

## 2019-03-28 PROCEDURE — 99214 OFFICE O/P EST MOD 30 MIN: CPT | Mod: 25,S$GLB,, | Performed by: PHYSICIAN ASSISTANT

## 2019-03-28 PROCEDURE — 99214 PR OFFICE/OUTPT VISIT, EST, LEVL IV, 30-39 MIN: ICD-10-PCS | Mod: 25,S$GLB,, | Performed by: PHYSICIAN ASSISTANT

## 2019-03-28 RX ORDER — IBUPROFEN 800 MG/1
800 TABLET ORAL EVERY 8 HOURS PRN
Qty: 40 TABLET | Refills: 0 | Status: SHIPPED | OUTPATIENT
Start: 2019-03-28 | End: 2019-05-01

## 2019-03-28 RX ORDER — LORATADINE 10 MG/1
10 TABLET ORAL DAILY
Qty: 30 TABLET | Refills: 0 | Status: SHIPPED | OUTPATIENT
Start: 2019-03-28 | End: 2019-05-01 | Stop reason: SDUPTHER

## 2019-03-28 RX ORDER — KETOROLAC TROMETHAMINE 30 MG/ML
30 INJECTION, SOLUTION INTRAMUSCULAR; INTRAVENOUS
Status: COMPLETED | OUTPATIENT
Start: 2019-03-28 | End: 2019-03-28

## 2019-03-28 RX ORDER — METHYLPREDNISOLONE 4 MG/1
4 TABLET ORAL SEE ADMIN INSTRUCTIONS
Qty: 1 PACKAGE | Refills: 0 | Status: SHIPPED | OUTPATIENT
Start: 2019-03-28 | End: 2019-04-03

## 2019-03-28 RX ADMIN — KETOROLAC TROMETHAMINE 30 MG: 30 INJECTION, SOLUTION INTRAMUSCULAR; INTRAVENOUS at 06:03

## 2019-03-28 NOTE — PATIENT INSTRUCTIONS
Earache, No Infection (Adult)  Earaches can happen without an infection. This occurs when air and fluid build up behind the eardrum causing a feeling of fullness and discomfort and reduced hearing. This is called otitis media with effusion (OME) or serous otitis media. It means there is fluid in the middle ear. It is not the same as acute otitis media, which is typically from infection.  OME can happen when you have a cold if congestion blocks the passage that drains the middle ear. This passage is called the eustachian tube. OME may also occur with nasal allergies or after a bacterial middle ear infection.    The pain or discomfort may come and go. You may hear clicking or popping sounds when you chew or swallow. You may feel that your balance is off. Or you may hear ringing in the ear.  It often takes from several weeks up to 3 months for the fluid to clear on its own. Oral pain relievers and ear drops help if there is pain. Decongestants and antihistamines sometimes help. Antibiotics don't help since there is no infection. Your doctor may prescribe a nasal spray to help reduce swelling in the nose and eustachian tube. This can allow the ear to drain.  If your OME doesn't improve after 3 months, surgery may be used to drain the fluid and insert a small tube in the eardrum to allow continued drainage.  Because the middle ear fluid can become infected, it is important to watch for signs of an ear infection which may develop later. These signs include increased ear pain, fever, or drainage from the ear.  Home care  The following guidelines will help you care for yourself at home:  · You may use over-the-counter medicine as directed to control pain, unless another medicine was prescribed. If you have chronic liver or kidney disease or ever had a stomach ulcer or GI bleeding, talk with your doctor before using these medicines. Aspirin should never be used in anyone under 18 years of age who is ill with a fever. It  may cause severe liver damage.  · You may use over-the-counter decongestants such as phenylephrine or pseudoephedrine. But they are not always helpful. Don't use nasal spray decongestants more than 3 days. Longer use can make congestion worse. Prescription nasal sprays from your doctor don't typically have those restrictions.  · Antihistamines may help if you are also having allergy symptoms.  · You may use medicines such as guaifenesin to thin mucus and promote drainage.  Follow-up care  Follow up with your healthcare provider or as advised if you are not feeling better after 3 days.  When to seek medical advice  Call your healthcare provider right away if any of the following occur:  · Your ear pain gets worse or does not start to improve   · Fever of 100.4°F (38°C) or higher, or as directed by your healthcare provider  · Fluid or blood draining from the ear  · Headache or sinus pain  · Stiff neck  · Unusual drowsiness or confusion  Date Last Reviewed: 10/1/2016  © 7923-0357 [employer]. [address] [place] [address]. All rights reserved. This information is not intended as a substitute for professional medical care. Always follow your healthcare professional's instructions.        Arthralgia    Arthralgia is the term for pain in or around the joint. It is a symptom, not a disease. This pain may involve one or more joints. In some cases, the pain moves from joint to joint.  There are many causes for joint pain. These include:  · Injury  · Osteoarthritis (wearing out of the joint surface)  · Gout (inflammation of the joint due to crystals in the joint fluid)  · Infection inside the joint    · Bursitis (inflammation of the fluid-filled sacs around the joint)  · Autoimmune disorders such as rheumatoid arthritis or lupus  · Tendonitis (inflammation of chords that attach muscle to bone)  Home care  · Rest the involved joint(s) until your symptoms improve.   · You may be prescribed pain  medicine. If none is prescribed, you may use acetaminophen or ibuprofen to control pain and inflammation.  Follow-up care  Follow up with your healthcare provider or as advised.  When to seek medical advice  Contact your healthcare provider right away if any of the following occurs:  · Pain, swelling, or redness of joint increases  · Pain worsens or recurs after a period of improvement  · Pain moves to other joints  · You cannot bear weight on the affected joint   · You cannot move the affected joint  · Joint appears deformed  · New rash appears  · Fever of 100.4ºF (38ºC) or higher, or as directed by your healthcare provider  Date Last Reviewed: 3/1/2017  © 9078-8932 [employer]. [address] [place] [address], [place] [address]. All rights reserved. This information is not intended as a substitute for professional medical care. Always follow your healthcare professional's instructions.    Please follow up with your Primary care provider within 2-5 days if your signs and symptoms have not resolved or worsen.     If your condition worsens or fails to improve we recommend that you receive another evaluation at the emergency room immediately or contact your primary medical clinic to discuss your concerns.   You must understand that you have received an Urgent Care treatment only and that you may be released before all of your medical problems are known or treated. You, the patient, will arrange for follow up care as instructed.     RED FLAGS/WARNING SYMPTOMS DISCUSSED WITH PATIENT THAT WOULD WARRANT EMERGENT MEDICAL ATTENTION. PATIENT VERBALIZED UNDERSTANDING.

## 2019-03-28 NOTE — PROGRESS NOTES
"Subjective:       Patient ID: Therese De Leon is a 28 y.o. female.    Vitals:  height is 5' 5" (1.651 m) and weight is 113.4 kg (250 lb). Her temperature is 97.8 °F (36.6 °C). Her blood pressure is 128/65 and her pulse is 80. Her respiration is 18 and oxygen saturation is 98%.     Chief Complaint: Otalgia and Ankle Pain    Discussed chronic right ankle pain. Patient had fracture when she was younger and has pain off and on.    Otalgia    There is pain in the right ear. This is a new problem. Episode onset: 2 days. The problem occurs constantly. The problem has been gradually worsening. There has been no fever. The pain is at a severity of 3/10. Pertinent negatives include no abdominal pain, coughing, diarrhea, ear discharge, headaches, hearing loss, neck pain, rash, rhinorrhea, sore throat or vomiting. She has tried nothing for the symptoms.   Ankle Pain    The incident occurred more than 1 week ago (2 years). There was no injury mechanism. The pain is present in the right ankle. The quality of the pain is described as stabbing and shooting. The pain is at a severity of 4/10. The pain is moderate. The pain has been constant since onset. Pertinent negatives include no inability to bear weight, loss of motion, loss of sensation, muscle weakness, numbness or tingling. She reports no foreign bodies present. The symptoms are aggravated by weight bearing and movement. She has tried acetaminophen for the symptoms. The treatment provided no relief.       Constitution: Negative for chills, fatigue and fever.   HENT: Positive for ear pain. Negative for ear discharge, hearing loss, congestion and sore throat.    Neck: Negative for neck pain and painful lymph nodes.   Cardiovascular: Negative for chest pain and leg swelling.   Eyes: Negative for double vision and blurred vision.   Respiratory: Negative for cough and shortness of breath.    Gastrointestinal: Negative for abdominal pain, nausea, vomiting and diarrhea. "   Genitourinary: Negative for dysuria, frequency, urgency and history of kidney stones.   Musculoskeletal: Negative for joint swelling, muscle cramps and muscle ache.   Skin: Negative for color change, pale, rash and bruising.   Allergic/Immunologic: Negative for seasonal allergies.   Neurological: Negative for dizziness, history of vertigo, light-headedness, passing out, headaches and numbness.   Hematologic/Lymphatic: Negative for swollen lymph nodes.   Psychiatric/Behavioral: Negative for nervous/anxious, sleep disturbance and depression. The patient is not nervous/anxious.        Objective:      Physical Exam   Constitutional: She is oriented to person, place, and time. She appears well-developed and well-nourished. She is cooperative.  Non-toxic appearance. She does not appear ill. No distress.   HENT:   Head: Normocephalic and atraumatic.   Right Ear: Hearing, external ear and ear canal normal. A middle ear effusion is present.   Left Ear: Hearing, tympanic membrane, external ear and ear canal normal.   Nose: Nose normal. No mucosal edema, rhinorrhea or nasal deformity. No epistaxis. Right sinus exhibits no maxillary sinus tenderness and no frontal sinus tenderness. Left sinus exhibits no maxillary sinus tenderness and no frontal sinus tenderness.   Mouth/Throat: Uvula is midline, oropharynx is clear and moist and mucous membranes are normal. No trismus in the jaw. Normal dentition. No uvula swelling. No posterior oropharyngeal erythema.   Eyes: Conjunctivae and lids are normal. Right eye exhibits no discharge. Left eye exhibits no discharge. No scleral icterus.   Sclera clear bilat   Neck: Trachea normal, normal range of motion, full passive range of motion without pain and phonation normal. Neck supple.   Cardiovascular: Normal rate, regular rhythm, normal heart sounds, intact distal pulses and normal pulses.   Pulmonary/Chest: Effort normal and breath sounds normal. No respiratory distress.   Abdominal:  Soft. Normal appearance and bowel sounds are normal. She exhibits no distension, no pulsatile midline mass and no mass. There is no tenderness.   Musculoskeletal: Normal range of motion. She exhibits no edema or deformity.        Right ankle: She exhibits normal range of motion, no swelling, no ecchymosis, no deformity, no laceration and normal pulse. Tenderness. Lateral malleolus and medial malleolus tenderness found. No AITFL, no CF ligament, no posterior TFL, no head of 5th metatarsal and no proximal fibula tenderness found. Achilles tendon normal.        Right lower leg: She exhibits no tenderness, no bony tenderness, no swelling, no edema, no deformity and no laceration.        Right foot: There is normal range of motion, no tenderness, no bony tenderness, no swelling, normal capillary refill, no crepitus, no deformity and no laceration.        Feet:    Neurological: She is alert and oriented to person, place, and time. She exhibits normal muscle tone. Coordination normal.   Skin: Skin is warm, dry and intact. She is not diaphoretic. No pallor.   Psychiatric: She has a normal mood and affect. Her speech is normal and behavior is normal. Judgment and thought content normal. Cognition and memory are normal.   Nursing note and vitals reviewed.      Assessment:       1. Chronic pain of right ankle    2. Acute effusion of right ear    3. Right ear pain        Plan:         Chronic pain of right ankle  -     ketorolac injection 30 mg  -     methylPREDNISolone (MEDROL DOSEPACK) 4 mg tablet; Take 1 tablet (4 mg total) by mouth As instructed (Take as directed). Take as directed  Dispense: 1 Package; Refill: 0  -     ibuprofen (ADVIL,MOTRIN) 800 MG tablet; Take 1 tablet (800 mg total) by mouth every 8 (eight) hours as needed.  Dispense: 40 tablet; Refill: 0    Acute effusion of right ear  -     loratadine (CLARITIN) 10 mg tablet; Take 1 tablet (10 mg total) by mouth once daily.  Dispense: 30 tablet; Refill: 0    Right  ear pain      Earache, No Infection (Adult)  Earaches can happen without an infection. This occurs when air and fluid build up behind the eardrum causing a feeling of fullness and discomfort and reduced hearing. This is called otitis media with effusion (OME) or serous otitis media. It means there is fluid in the middle ear. It is not the same as acute otitis media, which is typically from infection.  OME can happen when you have a cold if congestion blocks the passage that drains the middle ear. This passage is called the eustachian tube. OME may also occur with nasal allergies or after a bacterial middle ear infection.    The pain or discomfort may come and go. You may hear clicking or popping sounds when you chew or swallow. You may feel that your balance is off. Or you may hear ringing in the ear.  It often takes from several weeks up to 3 months for the fluid to clear on its own. Oral pain relievers and ear drops help if there is pain. Decongestants and antihistamines sometimes help. Antibiotics don't help since there is no infection. Your doctor may prescribe a nasal spray to help reduce swelling in the nose and eustachian tube. This can allow the ear to drain.  If your OME doesn't improve after 3 months, surgery may be used to drain the fluid and insert a small tube in the eardrum to allow continued drainage.  Because the middle ear fluid can become infected, it is important to watch for signs of an ear infection which may develop later. These signs include increased ear pain, fever, or drainage from the ear.  Home care  The following guidelines will help you care for yourself at home:  · You may use over-the-counter medicine as directed to control pain, unless another medicine was prescribed. If you have chronic liver or kidney disease or ever had a stomach ulcer or GI bleeding, talk with your doctor before using these medicines. Aspirin should never be used in anyone under 18 years of age who is ill with a  fever. It may cause severe liver damage.  · You may use over-the-counter decongestants such as phenylephrine or pseudoephedrine. But they are not always helpful. Don't use nasal spray decongestants more than 3 days. Longer use can make congestion worse. Prescription nasal sprays from your doctor don't typically have those restrictions.  · Antihistamines may help if you are also having allergy symptoms.  · You may use medicines such as guaifenesin to thin mucus and promote drainage.  Follow-up care  Follow up with your healthcare provider or as advised if you are not feeling better after 3 days.  When to seek medical advice  Call your healthcare provider right away if any of the following occur:  · Your ear pain gets worse or does not start to improve   · Fever of 100.4°F (38°C) or higher, or as directed by your healthcare provider  · Fluid or blood draining from the ear  · Headache or sinus pain  · Stiff neck  · Unusual drowsiness or confusion  Date Last Reviewed: 10/1/2016  © 0834-8041 [employer]. [address] [place] [address]. All rights reserved. This information is not intended as a substitute for professional medical care. Always follow your healthcare professional's instructions.        Arthralgia    Arthralgia is the term for pain in or around the joint. It is a symptom, not a disease. This pain may involve one or more joints. In some cases, the pain moves from joint to joint.  There are many causes for joint pain. These include:  · Injury  · Osteoarthritis (wearing out of the joint surface)  · Gout (inflammation of the joint due to crystals in the joint fluid)  · Infection inside the joint    · Bursitis (inflammation of the fluid-filled sacs around the joint)  · Autoimmune disorders such as rheumatoid arthritis or lupus  · Tendonitis (inflammation of chords that attach muscle to bone)  Home care  · Rest the involved joint(s) until your symptoms improve.   · You may be prescribed  pain medicine. If none is prescribed, you may use acetaminophen or ibuprofen to control pain and inflammation.  Follow-up care  Follow up with your healthcare provider or as advised.  When to seek medical advice  Contact your healthcare provider right away if any of the following occurs:  · Pain, swelling, or redness of joint increases  · Pain worsens or recurs after a period of improvement  · Pain moves to other joints  · You cannot bear weight on the affected joint   · You cannot move the affected joint  · Joint appears deformed  · New rash appears  · Fever of 100.4ºF (38ºC) or higher, or as directed by your healthcare provider  Date Last Reviewed: 3/1/2017  © 1700-6557 [employer]. [address] [place] [address], [place] [address]. All rights reserved. This information is not intended as a substitute for professional medical care. Always follow your healthcare professional's instructions.    Please follow up with your Primary care provider within 2-5 days if your signs and symptoms have not resolved or worsen.     If your condition worsens or fails to improve we recommend that you receive another evaluation at the emergency room immediately or contact your primary medical clinic to discuss your concerns.   You must understand that you have received an Urgent Care treatment only and that you may be released before all of your medical problems are known or treated. You, the patient, will arrange for follow up care as instructed.     RED FLAGS/WARNING SYMPTOMS DISCUSSED WITH PATIENT THAT WOULD WARRANT EMERGENT MEDICAL ATTENTION. PATIENT VERBALIZED UNDERSTANDING.

## 2019-05-01 ENCOUNTER — OFFICE VISIT (OUTPATIENT)
Dept: OBSTETRICS AND GYNECOLOGY | Facility: CLINIC | Age: 29
End: 2019-05-01
Payer: MEDICAID

## 2019-05-01 VITALS
BODY MASS INDEX: 40.88 KG/M2 | WEIGHT: 245.38 LBS | DIASTOLIC BLOOD PRESSURE: 60 MMHG | HEIGHT: 65 IN | SYSTOLIC BLOOD PRESSURE: 104 MMHG

## 2019-05-01 DIAGNOSIS — E28.2 PCOS (POLYCYSTIC OVARIAN SYNDROME): Primary | ICD-10-CM

## 2019-05-01 PROCEDURE — 99213 PR OFFICE/OUTPT VISIT, EST, LEVL III, 20-29 MIN: ICD-10-PCS | Mod: S$PBB,,, | Performed by: OBSTETRICS & GYNECOLOGY

## 2019-05-01 PROCEDURE — 99213 OFFICE O/P EST LOW 20 MIN: CPT | Mod: S$PBB,,, | Performed by: OBSTETRICS & GYNECOLOGY

## 2019-05-01 PROCEDURE — 99999 PR PBB SHADOW E&M-EST. PATIENT-LVL III: ICD-10-PCS | Mod: PBBFAC,,, | Performed by: OBSTETRICS & GYNECOLOGY

## 2019-05-01 PROCEDURE — 99999 PR PBB SHADOW E&M-EST. PATIENT-LVL III: CPT | Mod: PBBFAC,,, | Performed by: OBSTETRICS & GYNECOLOGY

## 2019-05-01 PROCEDURE — 99213 OFFICE O/P EST LOW 20 MIN: CPT | Mod: PBBFAC,PO | Performed by: OBSTETRICS & GYNECOLOGY

## 2019-05-01 RX ORDER — NORGESTIMATE AND ETHINYL ESTRADIOL 0.25-0.035
1 KIT ORAL DAILY
Qty: 30 TABLET | Refills: 11 | Status: SHIPPED | OUTPATIENT
Start: 2019-05-01 | End: 2020-04-30

## 2019-05-02 NOTE — PROGRESS NOTES
"GYNECOLOGY  :  Therese DeL eon is a 29 y.o.    Here today for  IUD removal , due to bloating, weight gain , breast tenderness and increase on unwanted hair   Under the chin and upper lip   Does not bleed on IUD , but would like it to be removed and switch to OCPs        Past Medical History:   Diagnosis Date    Acid reflux     Gallstones 11-12-15    Pneumonia     2 episodes of pneumonia    Seasonal allergies      Past Surgical History:   Procedure Laterality Date    CHOLECYSTECTOMY      CHOLECYSTECTOMY-LAPAROSCOPIC N/A 2017    Performed by Carmelo Celestin MD at [facility] OR     Family History   Problem Relation Age of Onset    Asthma Mother     Diabetes Mother     Scoliosis Mother      Social History     Tobacco Use    Smoking status: Current Every Day Smoker     Packs/day: 0.20     Years: 10.00     Pack years: 2.00     Types: Cigarettes    Smokeless tobacco: Never Used   Substance Use Topics    Alcohol use: No    Drug use: No     OB History    Para Term  AB Living   3 2 2 0 1 2   SAB TAB Ectopic Multiple Live Births   1 0 0 0 2      # Outcome Date GA Lbr Laith/2nd Weight Sex Delivery Anes PTL Lv   3 Term 16 39w0d 12:00 / 00:44 3.645 kg (8 lb 0.6 oz) F Vag-Spont EPI N BHAKTI   2 Term 04/21/15 39w1d  3.774 kg (8 lb 5.1 oz) M Vag-Spont Spinal N BHAKTI      Birth Comments: Received hep b vaccine in hospital   1 SAB 13               /60 (BP Location: Left arm)   Ht 5' 5" (1.651 m)   Wt 111.3 kg (245 lb 6 oz)   BMI 40.83 kg/m²     Last PAP=   LMP = not on IUD   Last Mammogram = n/a  Last Colonoscopy  =n/a      COMPREHENSIVE GYN HISTORY:       PAP History: Denies abnormal Paps.  Infection History: Denies STDs. Denies PID.  Benign History: Denies uterine fibroids. Denies ovarian cysts. Denies endometriosis.   Cancer History: Denies cervical cancer. Denies uterine cancer or hyperplasia. Denies ovarian cancer. Denies vulvar cancer or pre-cancer. Denies " vaginal cancer or pre-cancer. Denies breast cancer. Denies colon cancer.  Sexual Activity History: ( x ) Yes   (  )   no   Menstrual History: Age of menarche: ( 14  )  years.   Dysmenorrhea History:  absent  Contraception:  iudMirena     ROS  GENERAL:per HPI  SKIN:  thick hair under the chin, sideburns and upper lip   HEAD: Denies head injury or headache.   NODES: Denies enlarged lymph nodes.   CHEST: Denies chest pain or shortness of breath.   CARDIOVASCULAR: Denies palpitations or left sided chest pain.   ABDOMEN: No abdominal pain,no  diarrhea,constipation  nausea, vomiting or rectal bleeding.   URINARY: normal  Frequency,no  Dysuria or burning on urination.   REPRODUCTIVE: Per HPI   BREASTS: The patient sometimes performs breast self-examination and denies pain, lumps, or nipple discharge.   HEMATOLOGIC: No easy bruisability or excessive bleeding.   MUSCULOSKELETAL: Denies joint pain or swelling.   NEUROLOGIC: Denies syncope or weakness.   PSYCHIATRIC: Denies depression, anxiety or mood swings.    Physical Exam     Constitutional: She is oriented to person, place, and time. She appears well-developed and well-nourished. No distress.   HENT:   Head: Normocephalic.   NECK: Neck symmetric without masses or thyromegaly.  Cardiovascular: Normal rate and regular rhythm.   Pulmonary/Chest: Effort normal and breath sounds normal. No respiratory distress. She has no wheezes.   Breasts: Symmetrical, no skin changes or visible lesions. No palpable masses, nipple discharge or adenopathy bilaterally.  Abdominal: Soft not distended. Bowel sounds are normal. She exhibits   no masses . No tenderness to palpation.   Genitourinary: Pelvic exam was performed with patient supine.   External genitalia normal no lesions.Normal hair distribution   Adequate perineal body,normal urethral meatus   Vagina moist and well rugated without lesions, no vaginal  Discharge.   Cervix pink and without lesions. No bleeding. No significant cystocele  or rectocele. IUD strings seen  Bimanual exam showed uterus normal size, shape and position , mobile and nontender. Adnexa without masses or tenderness. Urethra and bladder normal  Extremities normal no cyanosis ,edema.     Procedures:  IUD removal  See note       A/P Therese De Leon 29 y.o.     Dx=  1-IUD removal   2- contraception counseling /managent   3- PCOS  4- Obesity BMI > 40  I    Plan:  PCOS work up   TVUS   OCP's   Patient counseled about contraception, including OCP's,Nuvaring, Mirena IUD and Nexplanon. Effectiveness, Advantages/disadvantages explained, patients chooses to start OCP's   Prescription sent to pharmacy on file   The use of the oral contraceptive has been fully discussed with the patient. This includes the proper method to initiate and continue the pills, the need for regular compliance to ensure adequate contraceptive effect, the physiology which make the pill effective, the instructions for what to do in event of a missed pill, and warnings about anticipated minor side effects such as breakthrough spotting, nausea, breast tenderness, weight changes, acne, headaches, etc.  She has been told of the more serious potential side effects such as MI, stroke, and deep vein thrombosis, all of which are very unlikely.  She has been asked to report any signs of such serious problems immediately.  She should back up the pill with a condom during any cycle in which antibiotics are prescribed, and during the first cycle as well. The need for additional protection, such as a condom, to prevent exposure to sexually transmitted diseases has also been discussed- the patient has been clearly reminded that OCP's cannot protect her against diseases such as HIV and others. She understands and wishes to take the medication as prescribed.       F/u in 1 m or av Alcantar M.D.   OB/GYN

## 2019-05-02 NOTE — PROGRESS NOTES
.Therese De Leon is a 29 y.o. female  presents for IUD removal because         2019    PRE-IUD REMOVAL COUNSELING:  The patient was advised of minimal risks of bleeding and pain and she agrees to proceed.    PROCEDURE:  TIME OUT PERFORMED.  IUD strings were  visualized at the os and grasped. IUD removed with gentle traction.  The patient tolerated the procedure well.    ASSESSMENT:  Contraceptive Management / Removal IUD. V25.0.    POST IUD REMOVAL COUNSELING:  Expect period-like flow to occur after Mirena IUD removal and periods to return to pre-IUD pattern.  Manage post IUD removal cramping with NSAIDs, Tylenol or Rx per [employer].    POST IUD REMOVAL CONTRACEPTION:  If planning pregnancy, RX for prenatal vitamins.    Counseling lasted approximately 15 minutes and all her questions were answered.  Wants to start OCPs (see note )   FOLLOW-UP: With me for annual gyn exam or av Alcantar M.D.   OB/GYN    2019

## 2019-05-21 ENCOUNTER — PROCEDURE VISIT (OUTPATIENT)
Dept: OBSTETRICS AND GYNECOLOGY | Facility: CLINIC | Age: 29
End: 2019-05-21
Payer: MEDICAID

## 2019-05-21 ENCOUNTER — LAB VISIT (OUTPATIENT)
Dept: LAB | Facility: HOSPITAL | Age: 29
End: 2019-05-21
Attending: OBSTETRICS & GYNECOLOGY
Payer: MEDICAID

## 2019-05-21 DIAGNOSIS — E28.2 PCOS (POLYCYSTIC OVARIAN SYNDROME): ICD-10-CM

## 2019-05-21 LAB
BASOPHILS # BLD AUTO: 0.03 K/UL (ref 0–0.2)
BASOPHILS NFR BLD: 0.3 % (ref 0–1.9)
DIFFERENTIAL METHOD: ABNORMAL
EOSINOPHIL # BLD AUTO: 0.5 K/UL (ref 0–0.5)
EOSINOPHIL NFR BLD: 5.2 % (ref 0–8)
ERYTHROCYTE [DISTWIDTH] IN BLOOD BY AUTOMATED COUNT: 14.1 % (ref 11.5–14.5)
ESTIMATED AVG GLUCOSE: 108 MG/DL (ref 68–131)
HBA1C MFR BLD HPLC: 5.4 % (ref 4–5.6)
HCT VFR BLD AUTO: 40.3 % (ref 37–48.5)
HGB BLD-MCNC: 12.7 G/DL (ref 12–16)
LYMPHOCYTES # BLD AUTO: 2.3 K/UL (ref 1–4.8)
LYMPHOCYTES NFR BLD: 24.3 % (ref 18–48)
MCH RBC QN AUTO: 26.5 PG (ref 27–31)
MCHC RBC AUTO-ENTMCNC: 31.5 G/DL (ref 32–36)
MCV RBC AUTO: 84 FL (ref 82–98)
MONOCYTES # BLD AUTO: 0.4 K/UL (ref 0.3–1)
MONOCYTES NFR BLD: 3.8 % (ref 4–15)
NEUTROPHILS # BLD AUTO: 6.1 K/UL (ref 1.8–7.7)
NEUTROPHILS NFR BLD: 66.3 % (ref 38–73)
PLATELET # BLD AUTO: 226 K/UL (ref 150–350)
PMV BLD AUTO: 10.6 FL (ref 9.2–12.9)
RBC # BLD AUTO: 4.8 M/UL (ref 4–5.4)
WBC # BLD AUTO: 9.26 K/UL (ref 3.9–12.7)

## 2019-05-21 PROCEDURE — 76830 PR  ECHOGRAPHY,TRANSVAGINAL: ICD-10-PCS | Mod: 26,S$PBB,, | Performed by: OBSTETRICS & GYNECOLOGY

## 2019-05-21 PROCEDURE — 76830 TRANSVAGINAL US NON-OB: CPT | Mod: PBBFAC,PO

## 2019-05-21 PROCEDURE — 36415 COLL VENOUS BLD VENIPUNCTURE: CPT

## 2019-05-21 PROCEDURE — 85025 COMPLETE CBC W/AUTO DIFF WBC: CPT

## 2019-05-21 PROCEDURE — 84402 ASSAY OF FREE TESTOSTERONE: CPT

## 2019-05-21 PROCEDURE — 76830 TRANSVAGINAL US NON-OB: CPT | Mod: 26,S$PBB,, | Performed by: OBSTETRICS & GYNECOLOGY

## 2019-05-21 PROCEDURE — 83498 ASY HYDROXYPROGESTERONE 17-D: CPT

## 2019-05-21 PROCEDURE — 83036 HEMOGLOBIN GLYCOSYLATED A1C: CPT

## 2019-05-24 LAB
17OHP SERPL-MCNC: 67 NG/DL (ref 35–413)
TESTOST FREE SERPL-MCNC: 1 PG/ML

## 2019-05-31 ENCOUNTER — TELEPHONE (OUTPATIENT)
Dept: OBSTETRICS AND GYNECOLOGY | Facility: CLINIC | Age: 29
End: 2019-05-31

## 2019-07-04 ENCOUNTER — HOSPITAL ENCOUNTER (EMERGENCY)
Facility: HOSPITAL | Age: 29
Discharge: HOME | End: 2019-07-04
Attending: EMERGENCY MEDICINE
Payer: MEDICAID

## 2019-07-04 VITALS
OXYGEN SATURATION: 98 % | SYSTOLIC BLOOD PRESSURE: 137 MMHG | TEMPERATURE: 98 F | WEIGHT: 230 LBS | HEART RATE: 90 BPM | BODY MASS INDEX: 38.27 KG/M2 | DIASTOLIC BLOOD PRESSURE: 63 MMHG | RESPIRATION RATE: 20 BRPM

## 2019-07-04 DIAGNOSIS — M79.604 RIGHT LEG PAIN: ICD-10-CM

## 2019-07-04 DIAGNOSIS — M54.9 ACUTE BILATERAL BACK PAIN, UNSPECIFIED BACK LOCATION: Primary | ICD-10-CM
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
## 2019-07-04 LAB
B-HCG UR QL: NEGATIVE
CTP QC/QA: YES

## 2019-07-04 PROCEDURE — 99285 EMERGENCY DEPT VISIT HI MDM: CPT

## 2019-07-04 PROCEDURE — 81025 URINE PREGNANCY TEST: CPT | Performed by: NURSE PRACTITIONER

## 2019-07-04 PROCEDURE — 25000003 PHARM REV CODE 250: Performed by: NURSE PRACTITIONER

## 2019-07-04 RX ORDER — IBUPROFEN 600 MG/1
600 TABLET ORAL EVERY 6 HOURS PRN
Qty: 20 TABLET | Refills: 0 | Status: SHIPPED | OUTPATIENT
Start: 2019-07-04

## 2019-07-04 RX ORDER — IBUPROFEN 600 MG/1
600 TABLET ORAL
Status: COMPLETED | OUTPATIENT
Start: 2019-07-04 | End: 2019-07-04

## 2019-07-04 RX ADMIN — IBUPROFEN 600 MG: 600 TABLET, FILM COATED ORAL at 03:07

## 2019-07-04 NOTE — ED PROVIDER NOTES
Encounter Date: 7/4/2019       History     Chief Complaint   Patient presents with    Fall     29 year old female presents to ed cc of slip and fall at [facility] 1 hour pta. patient denies hitting head no loc patient states back/ neck and leg pain patient ambulatory to ed triage      [age] year old female presents the ED for evaluation after a slip and fall.  The patient reports holding a cup of coffee when she tripped and fell while walking through a store about 1 hour PTA.  Pt states that she is unsure how she landed.  She denies head injury and LOC.  Pt reports pain to her entire back, neck, and right leg.  She was ambulatory at the scene and in to the ED.  She denies anticoagulant use.  No light-headedness, dizziness, CP, SOB, vomiting, weakness, loss of bowel or bladder control, saddle anesthesia, or gait disturbance.  No numbness/tingling.  No other complaints at this time.     The history is provided by the patient.     Review of patient's allergies indicates:   Allergen Reactions    Bactrim [sulfamethoxazole-trimethoprim]      Pt was told when she was younger    Pcn [penicillins]      Pt was told when she was younger     Past Medical History:   Diagnosis Date    Acid reflux     Gallstones 11-12-15    Pneumonia 2014    2 episodes of pneumonia    Seasonal allergies      Past Surgical History:   Procedure Laterality Date    CHOLECYSTECTOMY      CHOLECYSTECTOMY-LAPAROSCOPIC N/A 4/25/2017    Performed by Carmelo Celestin MD at [facility] OR     Family History   Problem Relation Age of Onset    Asthma Mother     Diabetes Mother     Scoliosis Mother      Social History     Tobacco Use    Smoking status: Current Every Day Smoker     Packs/day: 0.20     Years: 10.00     Pack years: 2.00     Types: Cigarettes    Smokeless tobacco: Never Used   Substance Use Topics    Alcohol use: No    Drug use: No     Review of Systems   Constitutional: Positive for activity change.   HENT: Negative for  congestion.    Eyes: Negative for visual disturbance.   Respiratory: Negative for cough and shortness of breath.    Gastrointestinal: Negative for abdominal pain and vomiting.   Musculoskeletal: Positive for arthralgias, back pain and neck pain.   Skin: Negative for color change and wound.   Neurological: Negative for dizziness, syncope, weakness, numbness and headaches.   Psychiatric/Behavioral: Negative for confusion.       Physical Exam     Initial Vitals [07/04/19 1441]   BP Pulse Resp Temp SpO2   137/63 90 20 98.2 °F (36.8 °C) 98 %      MAP       --         Physical Exam    Nursing note and vitals reviewed.  Constitutional: Vital signs are normal. She appears well-developed and well-nourished. She is Obese . She is active and cooperative. She is easily aroused.  Non-toxic appearance. She does not have a sickly appearance. She does not appear ill. No distress.   HENT:   Head: Normocephalic and atraumatic. Head is without abrasion and without contusion.   Right Ear: External ear normal. No drainage.   Left Ear: External ear normal. No drainage.   Nose: Nose normal.   Mouth/Throat: Uvula is midline, oropharynx is clear and moist and mucous membranes are normal.   Scalp palpated, no signs of head trauma. No tenderness, deformity, crepitus, or step-off.    Eyes: Conjunctivae, EOM and lids are normal.   Neck: Trachea normal and phonation normal. Neck supple. Spinous process tenderness and muscular tenderness present. No edema, no erythema and normal range of motion present. No neck rigidity.   Cardiovascular: Normal rate, regular rhythm and normal heart sounds.   Pulses:       Radial pulses are 2+ on the right side, and 2+ on the left side.        Dorsalis pedis pulses are 2+ on the right side, and 2+ on the left side.   Pulmonary/Chest: Effort normal and breath sounds normal.   Abdominal: Soft. Normal appearance and bowel sounds are normal. She exhibits no distension. There is no tenderness. There is no rigidity, no  rebound and no guarding.   Musculoskeletal:   Diffuse tenderness to entire back including midline tenderness.  Diffuse tenderness to entire right lower leg. No point tenderness.  No signs of trauma, swelling, or swelling.  No decreased ROM.    Neurological: She is alert, oriented to person, place, and time and easily aroused. She has normal strength. Coordination and gait normal. GCS eye subscore is 4. GCS verbal subscore is 5. GCS motor subscore is 6.   SLR negative bilaterally. 5/5 strength BLE and BUE.  Pt walks with steady gait.    Skin: Skin is warm, dry and intact. Capillary refill takes less than 2 seconds. No abrasion, no bruising and no rash noted. No erythema.   Psychiatric: Her speech is normal and behavior is normal. Judgment and thought content normal. Her mood appears anxious. Cognition and memory are normal.         ED Course   Procedures  Labs Reviewed   POCT URINE PREGNANCY          Imaging Results          X-Ray Cervical Spine AP And Lateral (Final result)  Result time 07/04/19 16:13:39    Final result by Panda Galvan MD (07/04/19 16:13:39)                 Impression:      No acute displaced fracture-dislocation identified.      Electronically signed by: Panda Galvan MD  Date:    07/04/2019  Time:    16:13             Narrative:    EXAMINATION:  XR CERVICAL SPINE AP LATERAL    CLINICAL HISTORY:  Unspecified fall, initial encounter    TECHNIQUE:  AP, lateral and open mouth views of the cervical spine were performed.    COMPARISON:  Chest radiograph 08/19/2017    FINDINGS:  Bones are well mineralized.  Slight levocurvature with straightening of the cervical lordosis, likely related to positioning or muscle strain.  The dens and lateral masses are well aligned and intact.  No displaced fracture, dislocation or destructive osseous process.  No prevertebral soft tissue swelling.  Vertebral body and intervertebral disc space heights appear relatively maintained.  No subcutaneous emphysema.  No  subcutaneous emphysema or radiodense retained foreign body.  Imaged lung apices are clear.                               X-Ray Lumbar Spine Ap And Lateral (Final result)  Result time 07/04/19 16:10:34    Final result by Panda Galvan MD (07/04/19 16:10:34)                 Impression:      No acute displaced fracture-dislocation identified.      Electronically signed by: Panda Galvan MD  Date:    07/04/2019  Time:    16:10             Narrative:    EXAMINATION:  XR LUMBAR SPINE AP AND LATERAL    CLINICAL HISTORY:  T/L-spine trauma, minor-mod, low back pain;    TECHNIQUE:  AP, lateral and spot images were performed of the lumbar spine.    COMPARISON:  Chest radiograph 08/19/2019    FINDINGS:  Five non-rib-bearing lumbar type vertebral bodies.  Bones are well mineralized. Overall alignment is within normal limits.  No displaced fracture, dislocation or destructive osseous process.  Vertebral body and intervertebral disc space heights appear relatively maintained.  Age-appropriate mild facet arthrosis of the lower lumbosacral spine.  No subcutaneous emphysema or radiodense retained foreign body.                               X-Ray Thoracic Spine AP And Lateral (Final result)  Result time 07/04/19 16:15:29    Final result by Kamron Alvarez MD (07/04/19 16:15:29)                 Impression:      No acute abnormality.      Electronically signed by: Kamron Alvarez MD  Date:    07/04/2019  Time:    16:15             Narrative:    EXAMINATION:  XR THORACIC SPINE AP LATERAL    CLINICAL HISTORY:  Unspecified fall, initial encounter    TECHNIQUE:  AP and lateral views of the thoracic spine were performed.    COMPARISON:  None    FINDINGS:  No acute fracture or subluxation.  Mild degenerative changes noted.  Soft tissues unremarkable.                               X-Ray Tibia Fibula 2 View Right (Final result)  Result time 07/04/19 16:06:43    Final result by Panda Galvan MD (07/04/19 16:06:43)                  Impression:      No acute displaced fracture-dislocation identified.      Electronically signed by: Panda Galvan MD  Date:    07/04/2019  Time:    16:06             Narrative:    EXAMINATION:  XR TIBIA FIBULA 2 VIEW RIGHT    CLINICAL HISTORY:  Unspecified fall, initial encounter    TECHNIQUE:  AP and lateral views of the right tibia and fibula were performed.    COMPARISON:  None.    FINDINGS:  Bones are well mineralized. Overall alignment is within normal limits.  No displaced fracture, dislocation or destructive osseous process. Joint spaces appear relatively maintained.  No subcutaneous emphysema or radiodense retained foreign body.                               X-Ray Foot Complete Right (Final result)  Result time 07/04/19 16:07:46    Final result by Panda Galvan MD (07/04/19 16:07:46)                 Impression:      No acute displaced fracture-dislocation identified.      Electronically signed by: Panda Galvan MD  Date:    07/04/2019  Time:    16:07             Narrative:    EXAMINATION:  XR FOOT COMPLETE 3 VIEW RIGHT    CLINICAL HISTORY:  . Unspecified fall, initial encounter    TECHNIQUE:  AP, lateral, and oblique views of the right foot were performed.    COMPARISON:  None    FINDINGS:  Bones are well mineralized. Overall alignment is within normal limits.  Lisfranc articulation is congruent.  No displaced fracture, dislocation or destructive osseous process. Joint spaces appear relatively maintained.  Tiny plantar calcaneal spur.  No subcutaneous emphysema or radiodense retained foreign body.                               X-Ray Knee 3 View Right (Final result)  Result time 07/04/19 16:06:19    Final result by Panda Galvan MD (07/04/19 16:06:19)                 Impression:      No acute displaced fracture-dislocation identified.      Electronically signed by: Panda Galvan MD  Date:    07/04/2019  Time:    16:06             Narrative:    EXAMINATION:  XR KNEE 3 VIEW RIGHT    CLINICAL  HISTORY:  Unspecified fall, initial encounter    TECHNIQUE:  AP, lateral, and Merchant views of the right knee were performed.    COMPARISON:  None    FINDINGS:  Bones are well mineralized. Overall alignment is within normal limits.  No displaced fracture, dislocation or destructive osseous process.  No large suprapatellar joint effusion.  Mild spurring of the posterior patella.  No subcutaneous emphysema or radiodense retained foreign body.                                 Medical Decision Making:   Initial Assessment:   30yo female here for evaluation after a mechanical fall 1 hr PTA.  She reports diffuse neck and back pain. She also reports right leg pain.  The patient is ambulatory. NVI intact BUE and BLE.  Diffuse TTP entire neck and back. No wounds.   Differential Diagnosis:   Strain, sprain, spasm, fracture, contusion  Clinical Tests:   Lab Tests: Reviewed and Ordered  The following lab test(s) were unremarkable: UPT  Radiological Study: Ordered and Reviewed  ED Management:  Xrays, PO motrin  X-rays negative for fracture.  I feel the patient's symptoms are likely due to contusion.  The patient is ambulatory with a steady gait.  Encouraged RICE therapy and follow up with her PCP within 3 days.  Return to the ED if her condition changes, progresses, or if there are any concerns.  Also encouraged her to use Tylenol as directed on the labeling.  Rx Motrin.  The patient verbalized understanding, compliance, and agreement with the treatment plan.                      Clinical Impression:       ICD-10-CM ICD-9-CM   1. Acute bilateral back pain, unspecified back location M54.9 724.5   2. Fall W19.XXXA E888.9   3. Right leg pain M79.604 729.5                                Rosina Lanier, [facility]  07/04/19 [id number]

## (undated) DEVICE — APPLIER CLIP ENDO MED/LG 10MM

## (undated) DEVICE — SYR 10CC LUER LOCK

## (undated) DEVICE — TROCAR KII FIOS 5MM X 100MM

## (undated) DEVICE — SCISSOR 5MMX35CM DIRECT DRIVE

## (undated) DEVICE — SUT 0 VICRYL / UR6 (J603)

## (undated) DEVICE — BLADE SURG CARBON STEEL SZ11

## (undated) DEVICE — APPLICATOR CHLORAPREP ORN 26ML

## (undated) DEVICE — NDL INSUF ULTRA VERESS 120MM

## (undated) DEVICE — ELECTRODE REM PLYHSV RETURN 9

## (undated) DEVICE — DRAPE INCISE IOBAN 2 23X23IN

## (undated) DEVICE — TUBING INSUFFLATION 10

## (undated) DEVICE — NDL HYPO REG 25G X 1 1/2

## (undated) DEVICE — SEE MEDLINE ITEM 157117

## (undated) DEVICE — TROCAR KII FIOS 11MM X 100MM

## (undated) DEVICE — SEE MEDLINE ITEM 152622

## (undated) DEVICE — SOL IRR NACL .9% 3000ML

## (undated) DEVICE — GLOVE SURG BIOGEL LATEX SZ 7.5

## (undated) DEVICE — SUT MONOCYRL 4-0 PS2 UND

## (undated) DEVICE — COVER OVERHEAD SURG LT BLUE

## (undated) DEVICE — KIT ANTIFOG

## (undated) DEVICE — PACK BASIC

## (undated) DEVICE — BAG TISS RETRV MONARCH 10MM

## (undated) DEVICE — SEE MEDLINE ITEM 156955

## (undated) DEVICE — SUT MONOCRYL 3-0 PS-2 UND

## (undated) DEVICE — DRAPE ABDOMINAL TIBURON 14X11

## (undated) DEVICE — IRRIGATOR ENDOSCOPY DISP.

## (undated) DEVICE — DISSECTOR 5MM ENDOPATH

## (undated) DEVICE — MANIFOLD 4 PORT

## (undated) DEVICE — SEE MEDLINE ITEM 157116

## (undated) DEVICE — ADHESIVE DERMABOND ADVANCED